# Patient Record
Sex: MALE | Race: WHITE | NOT HISPANIC OR LATINO
[De-identification: names, ages, dates, MRNs, and addresses within clinical notes are randomized per-mention and may not be internally consistent; named-entity substitution may affect disease eponyms.]

---

## 2017-04-19 ENCOUNTER — RECORD ABSTRACTING (OUTPATIENT)
Age: 42
End: 2017-04-19

## 2017-04-19 DIAGNOSIS — Z78.9 OTHER SPECIFIED HEALTH STATUS: ICD-10-CM

## 2017-04-19 DIAGNOSIS — R16.1 SPLENOMEGALY, NOT ELSEWHERE CLASSIFIED: ICD-10-CM

## 2017-04-19 DIAGNOSIS — Z83.2 FAMILY HISTORY OF DISEASES OF THE BLOOD AND BLOOD-FORMING ORGANS AND CERTAIN DISORDERS INVOLVING THE IMMUNE MECHANISM: ICD-10-CM

## 2017-04-19 DIAGNOSIS — R55 SYNCOPE AND COLLAPSE: ICD-10-CM

## 2017-04-19 DIAGNOSIS — R51 HEADACHE: ICD-10-CM

## 2017-04-19 DIAGNOSIS — Z83.49 FAMILY HISTORY OF OTHER ENDOCRINE, NUTRITIONAL AND METABOLIC DISEASES: ICD-10-CM

## 2017-04-19 DIAGNOSIS — Z82.5 FAMILY HISTORY OF ASTHMA AND OTHER CHRONIC LOWER RESPIRATORY DISEASES: ICD-10-CM

## 2017-04-19 DIAGNOSIS — Z72.3 LACK OF PHYSICAL EXERCISE: ICD-10-CM

## 2017-04-19 DIAGNOSIS — D18.03 HEMANGIOMA OF INTRA-ABDOMINAL STRUCTURES: ICD-10-CM

## 2017-04-20 ENCOUNTER — APPOINTMENT (OUTPATIENT)
Dept: INTERNAL MEDICINE | Facility: CLINIC | Age: 42
End: 2017-04-20

## 2017-05-02 ENCOUNTER — APPOINTMENT (OUTPATIENT)
Dept: INTERNAL MEDICINE | Facility: CLINIC | Age: 42
End: 2017-05-02

## 2017-05-02 VITALS
WEIGHT: 140 LBS | SYSTOLIC BLOOD PRESSURE: 104 MMHG | BODY MASS INDEX: 20.73 KG/M2 | HEIGHT: 69 IN | DIASTOLIC BLOOD PRESSURE: 70 MMHG | OXYGEN SATURATION: 95 % | RESPIRATION RATE: 18 BRPM | HEART RATE: 66 BPM | TEMPERATURE: 98.3 F

## 2017-07-14 ENCOUNTER — APPOINTMENT (OUTPATIENT)
Dept: DERMATOLOGY | Facility: CLINIC | Age: 42
End: 2017-07-14

## 2017-07-14 VITALS — HEIGHT: 70 IN | WEIGHT: 138 LBS | BODY MASS INDEX: 19.76 KG/M2

## 2017-07-14 DIAGNOSIS — Z86.59 PERSONAL HISTORY OF OTHER MENTAL AND BEHAVIORAL DISORDERS: ICD-10-CM

## 2017-07-14 DIAGNOSIS — L72.3 SEBACEOUS CYST: ICD-10-CM

## 2017-07-14 DIAGNOSIS — D48.5 NEOPLASM OF UNCERTAIN BEHAVIOR OF SKIN: ICD-10-CM

## 2017-08-14 LAB — CORE LAB BIOPSY: NORMAL

## 2017-11-02 ENCOUNTER — NON-APPOINTMENT (OUTPATIENT)
Age: 42
End: 2017-11-02

## 2017-11-02 ENCOUNTER — APPOINTMENT (OUTPATIENT)
Dept: INTERNAL MEDICINE | Facility: CLINIC | Age: 42
End: 2017-11-02
Payer: COMMERCIAL

## 2017-11-02 VITALS
SYSTOLIC BLOOD PRESSURE: 100 MMHG | HEIGHT: 70 IN | OXYGEN SATURATION: 98 % | BODY MASS INDEX: 20.33 KG/M2 | DIASTOLIC BLOOD PRESSURE: 70 MMHG | HEART RATE: 52 BPM | TEMPERATURE: 98.4 F | WEIGHT: 142 LBS | RESPIRATION RATE: 18 BRPM

## 2017-11-02 PROCEDURE — 94060 EVALUATION OF WHEEZING: CPT

## 2017-11-02 PROCEDURE — 99214 OFFICE O/P EST MOD 30 MIN: CPT | Mod: 25

## 2017-11-30 ENCOUNTER — OTHER (OUTPATIENT)
Age: 42
End: 2017-11-30

## 2018-05-10 ENCOUNTER — APPOINTMENT (OUTPATIENT)
Dept: INTERNAL MEDICINE | Facility: CLINIC | Age: 43
End: 2018-05-10

## 2018-07-11 ENCOUNTER — APPOINTMENT (OUTPATIENT)
Dept: INTERNAL MEDICINE | Facility: CLINIC | Age: 43
End: 2018-07-11
Payer: COMMERCIAL

## 2018-07-11 VITALS
HEIGHT: 70 IN | WEIGHT: 139 LBS | TEMPERATURE: 98.6 F | RESPIRATION RATE: 16 BRPM | OXYGEN SATURATION: 97 % | DIASTOLIC BLOOD PRESSURE: 70 MMHG | BODY MASS INDEX: 19.9 KG/M2 | HEART RATE: 62 BPM | SYSTOLIC BLOOD PRESSURE: 98 MMHG

## 2018-07-11 DIAGNOSIS — N28.1 CYST OF KIDNEY, ACQUIRED: ICD-10-CM

## 2018-07-11 PROCEDURE — 99396 PREV VISIT EST AGE 40-64: CPT | Mod: 25

## 2018-07-11 NOTE — PHYSICAL EXAM
[General Appearance - Alert] : alert [General Appearance - In No Acute Distress] : in no acute distress [Sclera] : the sclera and conjunctiva were normal [PERRL With Normal Accommodation] : pupils were equal in size, round, and reactive to light [Extraocular Movements] : extraocular movements were intact [Outer Ear] : the ears and nose were normal in appearance [Oropharynx] : the oropharynx was normal [Neck Appearance] : the appearance of the neck was normal [Neck Cervical Mass (___cm)] : no neck mass was observed [Jugular Venous Distention Increased] : there was no jugular-venous distention [Thyroid Diffuse Enlargement] : the thyroid was not enlarged [Thyroid Nodule] : there were no palpable thyroid nodules [Auscultation Breath Sounds / Voice Sounds] : lungs were clear to auscultation bilaterally [Heart Rate And Rhythm] : heart rate was normal and rhythm regular [Heart Sounds] : normal S1 and S2 [Heart Sounds Gallop] : no gallops [Murmurs] : no murmurs [Heart Sounds Pericardial Friction Rub] : no pericardial rub [Arterial Pulses Carotid] : carotid pulses were normal with no bruits [Edema] : there was no peripheral edema [Bowel Sounds] : normal bowel sounds [Abdomen Soft] : soft [Abdomen Tenderness] : non-tender [] : no hepato-splenomegaly [Abdomen Mass (___ Cm)] : no abdominal mass palpated [No CVA Tenderness] : no ~M costovertebral angle tenderness [No Spinal Tenderness] : no spinal tenderness [Nail Clubbing] : no clubbing  or cyanosis of the fingernails [FreeTextEntry1] : 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

## 2018-07-11 NOTE — HEALTH RISK ASSESSMENT
[Good] : ~his/her~  mood as  good [No falls in past year] : Patient reported no falls in the past year [0] : 2) Feeling down, depressed, or hopeless: Not at all (0) [Patient declined mammogram] : Patient declined mammogram [Patient declined PAP Smear] : Patient declined PAP Smear [Patient declined bone density test] : Patient declined bone density test [Patient declined colonoscopy] : Patient declined colonoscopy [Learning/Retaining New Information] : difficulty learning/retaining new information [Handling Complex Tasks] : difficulty handling complex tasks [Reasoning] : difficulty with reasoning [Spatial Ability and Orientation] : difficulty with spatial ability and orientation [None] : None [Alone] : lives alone [# of Members in Household ___] :  household currently consist of [unfilled] member(s) [Employed] : employed [Graduate School] : graduate school [] :  [Sexually Active] : sexually active [Feels Safe at Home] : Feels safe at home [Fully functional (bathing, dressing, toileting, transferring, walking, feeding)] : Fully functional (bathing, dressing, toileting, transferring, walking, feeding) [Fully functional (using the telephone, shopping, preparing meals, housekeeping, doing laundry, using] : Fully functional and needs no help or supervision to perform IADLs (using the telephone, shopping, preparing meals, housekeeping, doing laundry, using transportation, managing medications and managing finances) [Reports changes in hearing] : Reports changes in hearing [Reports changes in vision] : Reports changes in vision [Smoke Detector] : smoke detector [Carbon Monoxide Detector] : carbon monoxide detector [Safety elements used in home] : safety elements used in home [Seat Belt] :  uses seat belt [Sunscreen] : uses sunscreen [] : No [de-identified] : socially [Change in mental status noted] : No change in mental status noted [Language] : denies difficulty with language [Behavior] : denies difficulty with behavior [High Risk Behavior] : no high risk behavior [Reports changes in dental health] : Reports no changes in dental health [Guns at Home] : no guns at home [Travel to Developing Areas] : does not  travel to developing areas [TB Exposure] : is not being exposed to tuberculosis [Caregiver Concerns] : does not have caregiver concerns [HIVDate] : 2017 [HepatitisCDate] : 2017 [FreeTextEntry2] :  [de-identified] : tinitis [de-identified] : occasional blurry vision

## 2018-07-11 NOTE — HISTORY OF PRESENT ILLNESS
[de-identified] : Patient comes in today for a wellness evaluation.\par \par Overall, the patient has been doing fairly well. With regards to his underlying hospital sclerosis, he has not followed up with his neurologist recently. He has noted, however, that he has been having some minor symptoms including issues with depth perception which he feels is not where it should be. His speech is somewhat slower and occasionally he notices stuttering. He has not started on the new medication called Ocrevus. He was seen by neuro ophthalmology.\par \par The patient denies any pulmonary issues with regards to his underlying known Tamara Evangelina Daniella syndrome. He denies any breathlessness cough or sputum production. There has been no chest pain. He has not seen any new skin lesions that are also associated with this process.\par \par The patient does not exercise. His appetite is good. There has been no major fluctuation in his weight. He comes in for assessment

## 2018-07-11 NOTE — PLAN
[FreeTextEntry1] : 1. Will continue to observe without medication at this time.\par \par 2. The patient has been told to followup with his neurologist regarding the new and recent symptoms of which she is complaining.\par \par 3. 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 A cardiovascular exercise regimen has been stressed\par \par 5. Follow up with myself in 6 months with full pulmonary function testing and we'll yearly routine fasting blood work.

## 2018-09-13 LAB
CHOLEST SERPL-MCNC: 130
GLUCOSE SERPL-MCNC: 88
HDLC SERPL-MCNC: 51
LDLC SERPL CALC-MCNC: 66

## 2018-10-31 ENCOUNTER — APPOINTMENT (OUTPATIENT)
Dept: DERMATOLOGY | Facility: CLINIC | Age: 43
End: 2018-10-31
Payer: COMMERCIAL

## 2018-10-31 VITALS — HEIGHT: 70 IN | WEIGHT: 139 LBS | BODY MASS INDEX: 19.9 KG/M2

## 2018-10-31 DIAGNOSIS — D36.9 BENIGN NEOPLASM, UNSPECIFIED SITE: ICD-10-CM

## 2018-10-31 PROCEDURE — 17110 DESTRUCTION B9 LES UP TO 14: CPT

## 2018-10-31 PROCEDURE — 99213 OFFICE O/P EST LOW 20 MIN: CPT | Mod: 25

## 2018-11-21 ENCOUNTER — APPOINTMENT (OUTPATIENT)
Dept: DERMATOLOGY | Facility: CLINIC | Age: 43
End: 2018-11-21

## 2018-11-26 ENCOUNTER — APPOINTMENT (OUTPATIENT)
Dept: DERMATOLOGY | Facility: CLINIC | Age: 43
End: 2018-11-26
Payer: COMMERCIAL

## 2018-11-26 DIAGNOSIS — B07.9 VIRAL WART, UNSPECIFIED: ICD-10-CM

## 2018-11-26 DIAGNOSIS — D22.61 MELANOCYTIC NEVI OF RIGHT UPPER LIMB, INCLUDING SHOULDER: ICD-10-CM

## 2018-11-26 DIAGNOSIS — D22.5 MELANOCYTIC NEVI OF TRUNK: ICD-10-CM

## 2018-11-26 PROCEDURE — 17110 DESTRUCTION B9 LES UP TO 14: CPT

## 2018-11-26 PROCEDURE — 99213 OFFICE O/P EST LOW 20 MIN: CPT | Mod: 25

## 2018-12-13 ENCOUNTER — APPOINTMENT (OUTPATIENT)
Dept: INTERNAL MEDICINE | Facility: CLINIC | Age: 43
End: 2018-12-13
Payer: COMMERCIAL

## 2018-12-13 ENCOUNTER — NON-APPOINTMENT (OUTPATIENT)
Age: 43
End: 2018-12-13

## 2018-12-13 VITALS
WEIGHT: 139 LBS | HEART RATE: 64 BPM | RESPIRATION RATE: 20 BRPM | SYSTOLIC BLOOD PRESSURE: 94 MMHG | BODY MASS INDEX: 19.9 KG/M2 | TEMPERATURE: 97.5 F | HEIGHT: 70 IN | OXYGEN SATURATION: 95 % | DIASTOLIC BLOOD PRESSURE: 70 MMHG

## 2018-12-13 PROCEDURE — 94060 EVALUATION OF WHEEZING: CPT

## 2018-12-13 PROCEDURE — 99214 OFFICE O/P EST MOD 30 MIN: CPT | Mod: 25

## 2018-12-13 RX ORDER — CEFUROXIME AXETIL 500 MG/1
500 TABLET ORAL
Qty: 14 | Refills: 0 | Status: COMPLETED | COMMUNITY
Start: 2018-12-13 | End: 2018-12-20

## 2018-12-13 NOTE — REVIEW OF SYSTEMS
[Earache] : earache [Postnasal Drip] : postnasal drip [Nasal Discharge] : nasal discharge [Sore Throat] : sore throat [Wheezing] : wheezing [Cough] : cough [Negative] : Neurological [Fever] : no fever [Chills] : no chills [FreeTextEntry9] : neck pain

## 2018-12-13 NOTE — ASSESSMENT
[FreeTextEntry1] : \par Instructed on correct use of nasal spray.  May continue on daily basis if effective.  \par \par Increase fluids\par Tylenol  prn\par Call if symptoms not improving 3-4 days.\par

## 2018-12-13 NOTE — HISTORY OF PRESENT ILLNESS
[FreeTextEntry8] : Having increased nasal congestion with cough and sinus headache past 2 weeks.   H/O chronic sinus infections and Deviated septum.  He did well after  sinus Sx many years ago but now has more chronic congestion.    He has long standing neck discomfort but recently feels tighter than usual. Aggravated with certain movements.    Advil helps.  He denies any fevers or chills.  Some am sputum.

## 2019-02-06 ENCOUNTER — APPOINTMENT (OUTPATIENT)
Dept: INTERNAL MEDICINE | Facility: CLINIC | Age: 44
End: 2019-02-06

## 2019-02-14 ENCOUNTER — APPOINTMENT (OUTPATIENT)
Dept: INTERNAL MEDICINE | Facility: CLINIC | Age: 44
End: 2019-02-14

## 2019-03-07 ENCOUNTER — APPOINTMENT (OUTPATIENT)
Dept: INTERNAL MEDICINE | Facility: CLINIC | Age: 44
End: 2019-03-07

## 2019-03-25 ENCOUNTER — APPOINTMENT (OUTPATIENT)
Dept: INTERNAL MEDICINE | Facility: CLINIC | Age: 44
End: 2019-03-25
Payer: COMMERCIAL

## 2019-03-25 VITALS
RESPIRATION RATE: 18 BRPM | TEMPERATURE: 98 F | OXYGEN SATURATION: 98 % | HEART RATE: 72 BPM | DIASTOLIC BLOOD PRESSURE: 68 MMHG | BODY MASS INDEX: 19.76 KG/M2 | WEIGHT: 138 LBS | SYSTOLIC BLOOD PRESSURE: 92 MMHG | HEIGHT: 70 IN

## 2019-03-25 DIAGNOSIS — H01.009 UNSPECIFIED BLEPHARITIS UNSPECIFIED EYE, UNSPECIFIED EYELID: ICD-10-CM

## 2019-03-25 PROCEDURE — 94727 GAS DIL/WSHOT DETER LNG VOL: CPT

## 2019-03-25 PROCEDURE — 99214 OFFICE O/P EST MOD 30 MIN: CPT | Mod: 25

## 2019-03-25 PROCEDURE — 94060 EVALUATION OF WHEEZING: CPT

## 2019-03-25 PROCEDURE — 94729 DIFFUSING CAPACITY: CPT

## 2019-03-25 NOTE — PHYSICAL EXAM
[General Appearance - Alert] : alert [General Appearance - In No Acute Distress] : in no acute distress [Sclera] : the sclera and conjunctiva were normal [PERRL With Normal Accommodation] : pupils were equal in size, round, and reactive to light [Extraocular Movements] : extraocular movements were intact [Outer Ear] : the ears and nose were normal in appearance [Oropharynx] : the oropharynx was normal [Neck Appearance] : the appearance of the neck was normal [Neck Cervical Mass (___cm)] : no neck mass was observed [Jugular Venous Distention Increased] : there was no jugular-venous distention [Thyroid Diffuse Enlargement] : the thyroid was not enlarged [Thyroid Nodule] : there were no palpable thyroid nodules [Auscultation Breath Sounds / Voice Sounds] : lungs were clear to auscultation bilaterally [Heart Rate And Rhythm] : heart rate was normal and rhythm regular [Heart Sounds] : normal S1 and S2 [Heart Sounds Gallop] : no gallops [Murmurs] : no murmurs [Heart Sounds Pericardial Friction Rub] : no pericardial rub [Arterial Pulses Carotid] : carotid pulses were normal with no bruits [Edema] : there was no peripheral edema [Bowel Sounds] : normal bowel sounds [Abdomen Soft] : soft [Abdomen Tenderness] : non-tender [] : no hepato-splenomegaly [Abdomen Mass (___ Cm)] : no abdominal mass palpated [FreeTextEntry1] : 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

## 2019-03-25 NOTE — DATA REVIEWED
[FreeTextEntry1] : A pulmonary function test is performed. Lung volumes are mildly reduced in a symmetric fashion. Lung mechanics are within normal limits. Slight probability reactivity is demonstrated. The DLCO and saturation are maintained. This represents a mild degree of her strict lung disease. Slight airway reactivity is noted.

## 2019-03-25 NOTE — PLAN
[FreeTextEntry1] : 1. Continue to observe without medication on a daily basis.\par \par 2. Patient will continue to receive Ocrevus 600 mg via infusion every 6 months.\par \par 3. I would like the patient is receiving Pneumovax 23 vaccine. He will discuss this with his neurologist as to whether or not he can handle this from an immunologic standpoint.\par \par 4. The patient will undergo a followup MRI of the abdomen with gadolinium in November of 2020. That would be 3 years from the most recent study. The current recommendation is to perform this study every 36-48 months as surveillance to assess for a possible renal tumor due to this history of Sogo-Alrg-Jfpp syndrome.\par \par 5. Routine fasting blood work to be performed at this time.\par \par 6. CAT scan of the chest to be performed to evaluate for any worsening of his intrapulmonary cysts, and to assess for subpleural blebs.\par \par 7. Follow up with myself in 6 months with a wellness evaluation. Flu shot will be administered at that time.\par \par 8. The patient has been instructed to pain in his left eye with warm water and Ke's baby shampoo over the next several days. He has responded to this intervention in the past. If there is no improvement, we will call in an ophthalmic solution.

## 2019-04-10 ENCOUNTER — APPOINTMENT (OUTPATIENT)
Dept: DERMATOLOGY | Facility: CLINIC | Age: 44
End: 2019-04-10
Payer: COMMERCIAL

## 2019-04-10 DIAGNOSIS — L85.1 ACQUIRED KERATOSIS [KERATODERMA] PALMARIS ET PLANTARIS: ICD-10-CM

## 2019-04-10 PROCEDURE — 99213 OFFICE O/P EST LOW 20 MIN: CPT

## 2019-04-10 NOTE — ASSESSMENT
[FreeTextEntry1] : Lesion pared, no obvious residual wart;\par prob keratoderma\par dressed;  use advanced healing bandages to avoid trauma;\par

## 2019-06-09 PROBLEM — R51 CHRONIC HEADACHE DISORDER: Status: ACTIVE | Noted: 2017-04-19

## 2019-08-19 ENCOUNTER — APPOINTMENT (OUTPATIENT)
Dept: DERMATOLOGY | Facility: CLINIC | Age: 44
End: 2019-08-19
Payer: COMMERCIAL

## 2019-08-19 DIAGNOSIS — L25.5 UNSPECIFIED CONTACT DERMATITIS DUE TO PLANTS, EXCEPT FOOD: ICD-10-CM

## 2019-08-19 PROCEDURE — 99213 OFFICE O/P EST LOW 20 MIN: CPT

## 2019-08-20 PROBLEM — L25.5 RHUS DERMATITIS: Status: ACTIVE | Noted: 2019-08-20

## 2019-08-20 NOTE — PHYSICAL EXAM
[FreeTextEntry3] : Skin examination performed of the face, neck, chest, hands, lower legs;\par The patient is well, alert and oriented, pleasant and cooperative.\par Eyelids, conjunctivae, oral mucosa, digits and nails all normal.  \par No cervical adenopathy.\par \par \par + linear vesicular eczematous patches; R hand, fingers, arm, trunk, waistline;  trunk;

## 2019-08-20 NOTE — ASSESSMENT
[FreeTextEntry1] : rhus;  fairly extensive even after 3-4 days; spreading\par Prednisone taper, diprolene gel Rx;

## 2019-08-20 NOTE — HISTORY OF PRESENT ILLNESS
[de-identified] : The patient has been fit in for urgent appointment. Rash on right hand, arm;  very itchy;  was doing yardwork few days before outbreak

## 2019-10-02 ENCOUNTER — FORM ENCOUNTER (OUTPATIENT)
Age: 44
End: 2019-10-02

## 2019-10-03 ENCOUNTER — APPOINTMENT (OUTPATIENT)
Age: 44
End: 2019-10-03
Payer: COMMERCIAL

## 2019-10-03 ENCOUNTER — APPOINTMENT (OUTPATIENT)
Dept: RADIOLOGY | Facility: CLINIC | Age: 44
End: 2019-10-03
Payer: COMMERCIAL

## 2019-10-03 ENCOUNTER — FORM ENCOUNTER (OUTPATIENT)
Age: 44
End: 2019-10-03

## 2019-10-03 ENCOUNTER — OUTPATIENT (OUTPATIENT)
Dept: OUTPATIENT SERVICES | Facility: HOSPITAL | Age: 44
LOS: 1 days | End: 2019-10-03
Payer: COMMERCIAL

## 2019-10-03 VITALS
WEIGHT: 138 LBS | OXYGEN SATURATION: 99 % | SYSTOLIC BLOOD PRESSURE: 104 MMHG | HEIGHT: 70 IN | TEMPERATURE: 98.2 F | BODY MASS INDEX: 19.76 KG/M2 | RESPIRATION RATE: 18 BRPM | HEART RATE: 58 BPM | DIASTOLIC BLOOD PRESSURE: 72 MMHG

## 2019-10-03 DIAGNOSIS — M54.6 PAIN IN THORACIC SPINE: ICD-10-CM

## 2019-10-03 DIAGNOSIS — M62.838 OTHER MUSCLE SPASM: ICD-10-CM

## 2019-10-03 DIAGNOSIS — Z00.00 ENCOUNTER FOR GENERAL ADULT MEDICAL EXAMINATION W/OUT ABNORMAL FINDINGS: ICD-10-CM

## 2019-10-03 DIAGNOSIS — Z00.8 ENCOUNTER FOR OTHER GENERAL EXAMINATION: ICD-10-CM

## 2019-10-03 PROCEDURE — 72070 X-RAY EXAM THORAC SPINE 2VWS: CPT

## 2019-10-03 PROCEDURE — 72070 X-RAY EXAM THORAC SPINE 2VWS: CPT | Mod: 26

## 2019-10-03 PROCEDURE — 71046 X-RAY EXAM CHEST 2 VIEWS: CPT | Mod: 26

## 2019-10-03 PROCEDURE — 99213 OFFICE O/P EST LOW 20 MIN: CPT

## 2019-10-03 PROCEDURE — 71046 X-RAY EXAM CHEST 2 VIEWS: CPT

## 2019-10-03 RX ORDER — PREDNISONE 20 MG/1
20 TABLET ORAL
Qty: 24 | Refills: 0 | Status: COMPLETED | COMMUNITY
Start: 2019-08-20 | End: 2019-10-03

## 2019-10-03 NOTE — PHYSICAL EXAM
[No Acute Distress] : no acute distress [Well Nourished] : well nourished [Well Developed] : well developed [Supple] : supple [No Respiratory Distress] : no respiratory distress  [No Accessory Muscle Use] : no accessory muscle use [Clear to Auscultation] : lungs were clear to auscultation bilaterally [Normal Rate] : normal rate  [Regular Rhythm] : with a regular rhythm [Grossly Normal Strength/Tone] : grossly normal strength/tone [de-identified] : thoracic lumbar tenderness noted on palpation , full range of motion displayed,  motor strength 5/5

## 2019-10-03 NOTE — REVIEW OF SYSTEMS
[Back Pain] : back pain [Negative] : Genitourinary [Chills] : no chills [Fever] : no fever [Fatigue] : no fatigue [Shortness Of Breath] : no shortness of breath [Wheezing] : no wheezing [Cough] : no cough [FreeTextEntry9] : see HPI

## 2019-10-03 NOTE — PLAN
[FreeTextEntry1] : CXRY images reviewed by Dr. Villatoro. 1.1 cm opacity over right upper lobe. CT chest without contrast ordered and pt notified. he will f/up with Dr. Guidry next week as scheduled.

## 2019-10-03 NOTE — HISTORY OF PRESENT ILLNESS
[FreeTextEntry8] : Pt presents  to the office today with complaints of thoracic back discomfort x 1 week. He does not recall any precipitating trauma or injury. he reports the pain as sharp at  times , 4/10. It  is particularly bothersome when he twists from side to side . He has  a history of Krista- Evangelina Daniella Syndrome . He did undergo a pleurodesis in 2015  and sometimes has resultant  discomfort in his chest wall. He occasionally has mild dyspnea on exertion . He works teaching and remains active. He denies any increase in GOODE  or pleuritic pain. He denies fever, chills, chest pain, radiculopathy, bowel or bladder dysfunction. . \par He sees Dr. Cassidy  regularly for multiple sclerosis. Denies any increase in symptoms , vision change or  balance issues.\par He has an appointment  next week with Dr. Guidry . A f/ up CT chest is pending insurance approval.   \par

## 2019-10-03 NOTE — ASSESSMENT
[FreeTextEntry1] : CXRY PA/LAT , \par XRAY thoracic  at [facility]\par Flexeril 5 mg po bedtime PRN, spasms\par Naproxen 375 mg po BID PRN\par F/up Dr. Earl, if symptoms persist, May need MRI r/ o disc dse. \par F/up CT chest h/ of Tamara- Evangelina- Daniella syndrome , spoke to Mita, in appeal process \par  Appointment 10/10/19 Dr. Guidry  \par \par  \par

## 2019-10-04 ENCOUNTER — APPOINTMENT (OUTPATIENT)
Dept: CT IMAGING | Facility: CLINIC | Age: 44
End: 2019-10-04
Payer: COMMERCIAL

## 2019-10-04 ENCOUNTER — OUTPATIENT (OUTPATIENT)
Dept: OUTPATIENT SERVICES | Facility: HOSPITAL | Age: 44
LOS: 1 days | End: 2019-10-04
Payer: COMMERCIAL

## 2019-10-04 DIAGNOSIS — R93.89 ABNORMAL FINDINGS ON DIAGNOSTIC IMAGING OF OTHER SPECIFIED BODY STRUCTURES: ICD-10-CM

## 2019-10-04 PROCEDURE — 71250 CT THORAX DX C-: CPT | Mod: 26

## 2019-10-04 PROCEDURE — 71250 CT THORAX DX C-: CPT

## 2019-10-10 ENCOUNTER — APPOINTMENT (OUTPATIENT)
Dept: INTERNAL MEDICINE | Facility: CLINIC | Age: 44
End: 2019-10-10
Payer: COMMERCIAL

## 2019-10-10 VITALS
SYSTOLIC BLOOD PRESSURE: 114 MMHG | RESPIRATION RATE: 16 BRPM | HEART RATE: 62 BPM | DIASTOLIC BLOOD PRESSURE: 70 MMHG | BODY MASS INDEX: 20.76 KG/M2 | OXYGEN SATURATION: 97 % | TEMPERATURE: 98.1 F | HEIGHT: 70 IN | WEIGHT: 145 LBS

## 2019-10-10 PROCEDURE — 99396 PREV VISIT EST AGE 40-64: CPT

## 2019-10-10 RX ORDER — NAPROXEN 375 MG/1
375 TABLET ORAL
Qty: 30 | Refills: 1 | Status: DISCONTINUED | COMMUNITY
Start: 2019-10-03 | End: 2019-10-10

## 2019-10-10 RX ORDER — FLUTICASONE PROPIONATE 50 UG/1
50 SPRAY, METERED NASAL DAILY
Qty: 1 | Refills: 3 | Status: DISCONTINUED | COMMUNITY
Start: 2018-12-13 | End: 2019-10-10

## 2019-10-10 RX ORDER — BETAMETHASONE DIPROPIONATE 0.5 MG/G
0.05 GEL TOPICAL
Qty: 1 | Refills: 1 | Status: DISCONTINUED | COMMUNITY
Start: 2019-08-20 | End: 2019-10-10

## 2019-10-10 RX ORDER — CYCLOBENZAPRINE HYDROCHLORIDE 5 MG/1
5 TABLET, FILM COATED ORAL
Qty: 15 | Refills: 0 | Status: DISCONTINUED | COMMUNITY
Start: 2019-10-03 | End: 2019-10-10

## 2019-10-10 NOTE — REVIEW OF SYSTEMS
[Fatigue] : fatigue [Frequency] : frequency [Hesitancy] : hesitancy [Nocturia] : nocturia [Joint Pain] : joint pain [Back Pain] : back pain [Anxiety] : anxiety [Joint Swelling] : joint swelling [Negative] : Heme/Lymph [FreeTextEntry4] : Ringing in ears [de-identified] : Feels off balance

## 2019-10-10 NOTE — PHYSICAL EXAM
[Coordination Grossly Intact] : coordination grossly intact [Normal Gait] : normal gait [Normal Affect] : the affect was normal [Normal Insight/Judgement] : insight and judgment were intact [General Appearance - Alert] : alert [General Appearance - In No Acute Distress] : in no acute distress [Sclera] : the sclera and conjunctiva were normal [PERRL With Normal Accommodation] : pupils were equal in size, round, and reactive to light [Extraocular Movements] : extraocular movements were intact [Outer Ear] : the ears and nose were normal in appearance [Oropharynx] : the oropharynx was normal [Neck Appearance] : the appearance of the neck was normal [Neck Cervical Mass (___cm)] : no neck mass was observed [Jugular Venous Distention Increased] : there was no jugular-venous distention [Thyroid Diffuse Enlargement] : the thyroid was not enlarged [Thyroid Nodule] : there were no palpable thyroid nodules [Auscultation Breath Sounds / Voice Sounds] : lungs were clear to auscultation bilaterally [Heart Rate And Rhythm] : heart rate was normal and rhythm regular [Heart Sounds] : normal S1 and S2 [Heart Sounds Gallop] : no gallops [Murmurs] : no murmurs [Heart Sounds Pericardial Friction Rub] : no pericardial rub [Arterial Pulses Carotid] : carotid pulses were normal with no bruits [Edema] : there was no peripheral edema [Bowel Sounds] : normal bowel sounds [Abdomen Soft] : soft [Abdomen Tenderness] : non-tender [] : no hepato-splenomegaly [Abdomen Mass (___ Cm)] : no abdominal mass palpated [No CVA Tenderness] : no ~M costovertebral angle tenderness [No Spinal Tenderness] : no spinal tenderness [Nail Clubbing] : no clubbing  or cyanosis of the fingernails [FreeTextEntry1] : 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

## 2019-10-10 NOTE — PLAN
[FreeTextEntry1] : 1. Exercise to Increase cardiovascular capacity.\par \par 2. Flu and pneumonia vaccines are refused.\par \par 3. MRI of abdomen and pelvis in one year To evaluate and screen for the possibility of renal cell carcinoma, given his history of Ywbg-Lxak-Euan syndrome.\par \par 4. Referred to cardiothoracic surgeon Dr. Lemus for consultation regarding possible prophylactic removal of blebs in left hemithorax.The patient is concerned about possibly developing another spontaneous pneumothorax in the future.\par \par 5. Routine fasting blood work including STD analysis.\par \par 6. Follow up with myself in 6 months for a PFT.\par \par 7. A neurosurgical consultation with Dr. Earl for further evaluation of his back pain.

## 2019-10-10 NOTE — END OF VISIT
[FreeTextEntry3] : I, Loco Mendoza, acted solely as a scribe for Dr. Lance Guidry MD on this date 10/10/2019. \par \par All medical records entries made by the Scribe were at my, Dr. Lance Guidry MD, direction and personally dictated by me on 10/10/2019. I have personally reviewed the chart and agree that the record accurately reflects my personal performance of the history, physical exam, assessment, and plan. I have also personally directed, reviewed, and agreed with the chart.

## 2019-10-10 NOTE — HEALTH RISK ASSESSMENT
[Good] : ~his/her~  mood as  good [Yes] : Yes [Monthly or less (1 pt)] : Monthly or less (1 point) [1 or 2 (0 pts)] : 1 or 2 (0 points) [Never (0 pts)] : Never (0 points) [No] : In the past 12 months have you used drugs other than those required for medical reasons? No [0] : 1) Little interest or pleasure doing things: Not at all (0) [No Retinopathy] : No retinopathy [None] : None [Employed] : employed [Sexually Active] : sexually active [Feels Safe at Home] : Feels safe at home [Fully functional (using the telephone, shopping, preparing meals, housekeeping, doing laundry, using] : Fully functional and needs no help or supervision to perform IADLs (using the telephone, shopping, preparing meals, housekeeping, doing laundry, using transportation, managing medications and managing finances) [Fully functional (bathing, dressing, toileting, transferring, walking, feeding)] : Fully functional (bathing, dressing, toileting, transferring, walking, feeding) [Reports normal functional visual acuity (ie: able to read med bottle)] : Reports normal functional visual acuity [Smoke Detector] : smoke detector [Carbon Monoxide Detector] : carbon monoxide detector [Sunscreen] : uses sunscreen [Seat Belt] :  uses seat belt [] : No [de-identified] : Occasional [EyeExamDate] : 05/2019 [Change in mental status noted] : No change in mental status noted [Language] : denies difficulty with language [Behavior] : denies difficulty with behavior [Learning/Retaining New Information] : denies difficulty learning/retaining new information [Handling Complex Tasks] : denies difficulty handling complex tasks [Reasoning] : denies difficulty with reasoning [Spatial Ability and Orientation] : denies difficulty with spatial ability and orientation [High Risk Behavior] : no high risk behavior [Reports changes in hearing] : Reports no changes in hearing [Reports changes in vision] : Reports no changes in vision [Reports changes in dental health] : Reports no changes in dental health [Guns at Home] : no guns at home

## 2019-10-10 NOTE — HISTORY OF PRESENT ILLNESS
[de-identified] : This patient comes in today for a wellness evaluation.\par \par He reports occasional debilitating back pain. Back and chest X-Ray were performed and were unremarkable. He denies paresthesias, or incontinence of urine or stool.He notes that the symptoms are slowly improving. He has had similar issues in the past.\par \par With regard to his underlying pulmonary issues, he had a CT scan of his chest performed on 10/4/2019. It revealed bilateral lung cysts, partial resection of the right upper lung, and right lower lobe subsegmental atelectasis. These changes are consistent with his known history of Afhc-Uglg-Sjoy syndrome. He denies any cough, or sputum production. Occasionally, he does complain of discomfort in the right hemithorax closer to the midline. The symptoms usually resolve on their own. He does not have any dyspnea.He has no history of smoking. \par \par His dermatologic health is closely monitored by Dr. Reed.\par \par With regard to his multiple sclerosis, he receives Ocrevus treatment every 6 months. He continues to be followed carefully by neurology. He does not exercise regularly and denies any headaches. \par \par Today he comes in for this assessment.

## 2019-10-10 NOTE — ADDENDUM
[FreeTextEntry1] : I, Loco Mendoza, acted solely as a scribe for Dr. Guidry on this date 10/10/2019.

## 2019-10-11 ENCOUNTER — TRANSCRIPTION ENCOUNTER (OUTPATIENT)
Age: 44
End: 2019-10-11

## 2019-10-27 ENCOUNTER — TRANSCRIPTION ENCOUNTER (OUTPATIENT)
Age: 44
End: 2019-10-27

## 2019-11-25 ENCOUNTER — APPOINTMENT (OUTPATIENT)
Dept: INTERNAL MEDICINE | Facility: CLINIC | Age: 44
End: 2019-11-25
Payer: COMMERCIAL

## 2019-11-25 ENCOUNTER — NON-APPOINTMENT (OUTPATIENT)
Age: 44
End: 2019-11-25

## 2019-11-25 ENCOUNTER — APPOINTMENT (OUTPATIENT)
Dept: DERMATOLOGY | Facility: CLINIC | Age: 44
End: 2019-11-25
Payer: COMMERCIAL

## 2019-11-25 VITALS
OXYGEN SATURATION: 97 % | SYSTOLIC BLOOD PRESSURE: 112 MMHG | DIASTOLIC BLOOD PRESSURE: 70 MMHG | RESPIRATION RATE: 18 BRPM | WEIGHT: 137.99 LBS | TEMPERATURE: 98.3 F | HEART RATE: 64 BPM | BODY MASS INDEX: 19.75 KG/M2 | HEIGHT: 70 IN

## 2019-11-25 VITALS — WEIGHT: 145 LBS | BODY MASS INDEX: 20.76 KG/M2 | HEIGHT: 70 IN

## 2019-11-25 DIAGNOSIS — L82.1 OTHER SEBORRHEIC KERATOSIS: ICD-10-CM

## 2019-11-25 DIAGNOSIS — L72.3 SEBACEOUS CYST: ICD-10-CM

## 2019-11-25 PROCEDURE — 94060 EVALUATION OF WHEEZING: CPT

## 2019-11-25 PROCEDURE — 10061 I&D ABSCESS COMP/MULTIPLE: CPT

## 2019-11-25 PROCEDURE — 99214 OFFICE O/P EST MOD 30 MIN: CPT | Mod: 25

## 2019-11-25 RX ORDER — AMOXICILLIN AND CLAVULANATE POTASSIUM 875; 125 MG/1; MG/1
875-125 TABLET, COATED ORAL
Qty: 20 | Refills: 0 | Status: COMPLETED | COMMUNITY
Start: 2019-10-27

## 2019-11-25 RX ORDER — AZELASTINE HYDROCHLORIDE 137 UG/1
137 SPRAY, METERED NASAL
Qty: 30 | Refills: 0 | Status: DISCONTINUED | COMMUNITY
Start: 2019-05-06 | End: 2019-11-25

## 2019-11-25 RX ORDER — BROMPHENIRAMINE MALEATE, PSEUDOEPHEDRINE HYDROCHLORIDE, 2; 30; 10 MG/5ML; MG/5ML; MG/5ML
30-2-10 SYRUP ORAL
Qty: 300 | Refills: 0 | Status: DISCONTINUED | COMMUNITY
Start: 2019-10-27 | End: 2019-11-25

## 2019-11-25 NOTE — ASSESSMENT
[FreeTextEntry1] : Benign SKs;  upper back\par inflamed cyst;  I&D today;  f/u if recurs; nature explained

## 2019-11-25 NOTE — ASSESSMENT
[FreeTextEntry1] : Doxycycline x 7 days\par \par Use Astelin daily\par \par Consider return to ENT for re-evaluation of chronic sinusitis.

## 2019-11-25 NOTE — PHYSICAL EXAM
[Normal TMs] : both tympanic membranes were normal [de-identified] : no sinus tenderness [Normal] : the outer ears and nose were normal in appearance and the oropharynx was normal [General Appearance - In No Acute Distress] : in no acute distress [Neck Appearance] : the appearance of the neck was normal [General Appearance - Alert] : alert [Jugular Venous Distention Increased] : there was no jugular-venous distention [Neck Cervical Mass (___cm)] : no neck mass was observed [Thyroid Diffuse Enlargement] : the thyroid was not enlarged [Thyroid Nodule] : there were no palpable thyroid nodules [] : no respiratory distress [Heart Sounds] : normal S1 and S2 [Heart Rate And Rhythm] : heart rate was normal and rhythm regular [Auscultation Breath Sounds / Voice Sounds] : lungs were clear to auscultation bilaterally [FreeTextEntry1] : 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 [Murmurs] : no murmurs [Heart Sounds Gallop] : no gallops [Heart Sounds Pericardial Friction Rub] : no pericardial rub

## 2019-11-25 NOTE — HISTORY OF PRESENT ILLNESS
[FreeTextEntry8] : cough\par \par Developed cough and nasal congestion 5 days ago.  NO fevers or chills.  +wheeze-mild.  cough is occasionally of  yellow sputum.      Cough worse in evening.  currently taking sudafed which helps his nasal congestion.\par Treated one month ago by Urgent care for sinusitis.  Placed on Augmentin and cough syrup with  full resolution of symptoms after few days.   \par \par Had cyst removed from L neck today.  \par \par

## 2019-11-25 NOTE — HISTORY OF PRESENT ILLNESS
[de-identified] : Pt. c/o lesion on neck;  enlarged since last 5d;  not painful\par has had similar lesions in past;\par \par also:  changing spot on back

## 2020-03-01 ENCOUNTER — LABORATORY RESULT (OUTPATIENT)
Age: 45
End: 2020-03-01

## 2020-03-02 ENCOUNTER — TRANSCRIPTION ENCOUNTER (OUTPATIENT)
Age: 45
End: 2020-03-02

## 2020-03-03 LAB
BASOPHILS # BLD AUTO: 0.05 K/UL
BASOPHILS NFR BLD AUTO: 0.7 %
EOSINOPHIL # BLD AUTO: 0.2 K/UL
EOSINOPHIL NFR BLD AUTO: 2.7 %
HCT VFR BLD CALC: 49 %
HGB BLD-MCNC: 13.5 G/DL
IMM GRANULOCYTES NFR BLD AUTO: 0.3 %
LYMPHOCYTES # BLD AUTO: 0.94 K/UL
LYMPHOCYTES NFR BLD AUTO: 12.7 %
MAN DIFF?: NORMAL
MCHC RBC-ENTMCNC: 17.2 PG
MCHC RBC-ENTMCNC: 27.6 GM/DL
MCV RBC AUTO: 62.5 FL
MONOCYTES # BLD AUTO: 0.54 K/UL
MONOCYTES NFR BLD AUTO: 7.3 %
NEUTROPHILS # BLD AUTO: 5.67 K/UL
NEUTROPHILS NFR BLD AUTO: 76.3 %
PLATELET # BLD AUTO: 269 K/UL
RBC # BLD: 7.84 M/UL
RBC # FLD: 20 %
WBC # FLD AUTO: 7.42 K/UL

## 2020-04-22 ENCOUNTER — APPOINTMENT (OUTPATIENT)
Dept: INTERNAL MEDICINE | Facility: CLINIC | Age: 45
End: 2020-04-22
Payer: COMMERCIAL

## 2020-04-22 DIAGNOSIS — J93.83 OTHER PNEUMOTHORAX: ICD-10-CM

## 2020-04-22 PROCEDURE — 99213 OFFICE O/P EST LOW 20 MIN: CPT | Mod: 95

## 2020-04-22 RX ORDER — DOXYCYCLINE HYCLATE 100 MG/1
100 TABLET ORAL TWICE DAILY
Qty: 14 | Refills: 0 | Status: DISCONTINUED | COMMUNITY
Start: 2019-11-25 | End: 2020-04-22

## 2020-04-22 NOTE — HISTORY OF PRESENT ILLNESS
[Home] : at home, [unfilled] , at the time of the visit. [Medical Office: ([facility])___] : at the medical office located in  [Patient] : the patient [Self] : self [de-identified] : Spoke with the patient today.\par \par The patient states, that at this time, he is doing relatively well. With regards to his underlying pulmonary issues, he remains asymptomatic. He did undergo a followup CAT scan of the chest which was performed in October. It revealed multiple cysts in the lungs consistent with his clinical diagnosis of Wnbc-Pqby-Yqhj syndrome. He was evaluated by his thoracic surgeon, to see if he would be a candidate for prophylactic bullectomy on the left. It was felt, that this was not necessary at this time. He told the patient, that he should return for reevaluation in 5 years.\par \par The patient does have a history of multiple sclerosis. He receives Ocrevus semi-annually. He is one month overdue on his medication, and he has noted that he has been having some issues with speech and balance. The issues are minor, but he definitely can notice the difference. He denies any headaches, double vision, or blurred vision.\par \par The patient remains active. He denies any constitutional symptoms at this time. He did undergo blood work, and some of the results did not interface properly, so they are still pending review.

## 2020-04-22 NOTE — PLAN
[FreeTextEntry1] : 1. We'll attempt to obtain the results of the blood work performed last month.\par \par 2. The patient will continue to quarantine due to the corona virus epidemic. He is immunocompromised due to the fact that he is on Ocrevus for treatment of his multiple sclerosis.\par \par 3. Followup in 6 months with full pulmonary function testing. The patient will need to be scheduled for an MRI of the abdomen and pelvis at that time to evaluate for possible renal cell carcinoma, which has an increased incidence in patients with Kjwd-Lcfq-Jqyt syndrome.\par \par total length of time of this was 15 minutes.

## 2020-04-22 NOTE — PHYSICAL EXAM
Subjective:       Patient ID: Dennis Ovalle Jr. is a 21 y.o. male.    Chief Complaint: No chief complaint on file.    HPI The patient location is: Home  The chief complaint leading to consultation is:  Cough, altered taste and smell, sneezing, possible COVID contact  Visit type: Virtual visit with synchronous audio and video  Total time spent with patient:  15 min  Each patient to whom he or she provides medical services by telemedicine is:  (1) informed of the relationship between the physician and patient and the respective role of any other health care provider with respect to management of the patient; and (2) notified that he or she may decline to receive medical services by telemedicine and may withdraw from such care at any time.    Notes:  21-year-old  male with mild intermittent asthma is seen via video visit secondary to concerns of possible COVID.  He reports symptoms of shortness of breath, cough, and sneezing for which he was seen in the emergency room on Wednesday.  Full evaluation was performed including chest x-ray which returned within normal limits.  He was noted not to be a candidate for testing.  He continues to remain afebrile but notes that yesterday he began to note and altered sense of taste and smell and became concerned for possible COVID.  He reports that his cousin has been tested and did test positive and he was in contact with his cousin.  He has continued to remain afebrile.  He reports that he has not taken Flonase nasal spray, albuterol, or Singulair which all have previously been prescribed for his asthma.  He has used Tessalon Perles for cough with improvement of his cough.    Review of Systems   Constitutional: Negative for appetite change, chills, fatigue and fever.   HENT: Positive for congestion, postnasal drip, rhinorrhea and sneezing. Negative for ear pain, hearing loss, sinus pressure, sore throat and tinnitus.         Altered taste and smell   Eyes:  Negative for redness, itching and visual disturbance.   Respiratory: Positive for cough and chest tightness. Negative for shortness of breath.    Cardiovascular: Negative for chest pain and palpitations.   Gastrointestinal: Negative for abdominal pain, constipation, diarrhea, nausea and vomiting.   Genitourinary: Negative for decreased urine volume, difficulty urinating, dysuria, frequency, hematuria and urgency.   Musculoskeletal: Negative for back pain, myalgias, neck pain and neck stiffness.   Skin: Negative for rash.   Neurological: Negative for dizziness, light-headedness and headaches.   Psychiatric/Behavioral: Negative.        Objective:      Limited physical exam is performed via telemedicine.  The patient is sitting in his home and appears comfortable without any shortness of breath or cough.  Assessment:       1. Mild intermittent asthma without complication        Plan:       Mild intermittent asthma without complication  -     fluticasone propionate (FLONASE) 50 mcg/actuation nasal spray; 2 sprays (100 mcg total) by Each Nostril route once daily.  Dispense: 16 g; Refill: 11  -     montelukast (SINGULAIR) 10 mg tablet; Take 1 tablet (10 mg total) by mouth every evening.  Dispense: 30 tablet; Refill: 11  -     albuterol (PROVENTIL/VENTOLIN HFA) 90 mcg/actuation inhaler; Inhale 2 puffs into the lungs every 6 (six) hours as needed for Wheezing or Shortness of Breath. Rescue  Dispense: 18 g; Refill: 11      I have strongly encouraged the patient to be compliant with all asthma medications as his alteration of smell and taste may be secondary to allergic rhinitis.  At this time the patient does not meet criteria for COVID testing.  I have encouraged the patient to continue self-quarantine and to continue monitoring his symptoms.  I have encouraged the patient to reach out if he has any worsening symptoms such as fever greater than 100.4, worsening cough, worsening shortness of breath.  Patient expresses  understanding.   [Normal] : no acute distress, well nourished, well developed and well-appearing [No Respiratory Distress] : no respiratory distress  [Normal Affect] : the affect was normal [Normal Insight/Judgement] : insight and judgment were intact

## 2020-04-23 ENCOUNTER — APPOINTMENT (OUTPATIENT)
Dept: INTERNAL MEDICINE | Facility: CLINIC | Age: 45
End: 2020-04-23

## 2020-07-08 ENCOUNTER — APPOINTMENT (OUTPATIENT)
Dept: INTERNAL MEDICINE | Facility: CLINIC | Age: 45
End: 2020-07-08
Payer: COMMERCIAL

## 2020-07-08 VITALS
BODY MASS INDEX: 20.19 KG/M2 | DIASTOLIC BLOOD PRESSURE: 72 MMHG | HEIGHT: 70 IN | TEMPERATURE: 98.6 F | WEIGHT: 141 LBS | SYSTOLIC BLOOD PRESSURE: 114 MMHG | HEART RATE: 60 BPM | OXYGEN SATURATION: 98 % | RESPIRATION RATE: 16 BRPM

## 2020-07-08 DIAGNOSIS — R30.0 DYSURIA: ICD-10-CM

## 2020-07-08 PROCEDURE — 81003 URINALYSIS AUTO W/O SCOPE: CPT | Mod: QW

## 2020-07-08 PROCEDURE — 99214 OFFICE O/P EST MOD 30 MIN: CPT

## 2020-07-08 NOTE — PLAN
[FreeTextEntry1] : Mr. Vegas presents for acute evaluation complaining of "dysuria". Urine dip shows trace leukocyte esterase, however, it is nitrate negative and negative for blood. Patient will be given a trial of Cipro 500 mg b.i.d. x7 days and will send a urine for microscopic urinalysis as well as urine culture. I also referred him to Dr. David Boyer for urology evaluation. Followup as scheduled.

## 2020-07-08 NOTE — HISTORY OF PRESENT ILLNESS
[FreeTextEntry8] : Mr. Vegas presents for acute evaluation. He is complaining of a feeling of discomfort with urination, especially after having sexual intercourse. This has been an ongoing issue with the patient but it seems to be worse in the past few days. He did go to a neurologist the prostate 20 years ago for similar complaints. He has no fevers or chills. There is no change in the color of his urine. Mr. Vegas denies any hematuria.

## 2020-07-09 LAB
APPEARANCE: CLEAR
BACTERIA: NEGATIVE
BILIRUB UR QL STRIP: NEGATIVE
BILIRUBIN URINE: NEGATIVE
BLOOD URINE: NEGATIVE
CLARITY UR: NORMAL
COLLECTION METHOD: NORMAL
COLOR: NORMAL
GLUCOSE QUALITATIVE U: NEGATIVE
GLUCOSE UR-MCNC: NEGATIVE
HCG UR QL: 0.2 EU/DL
HGB UR QL STRIP.AUTO: NEGATIVE
HYALINE CASTS: 0 /LPF
KETONES UR-MCNC: NEGATIVE
KETONES URINE: NEGATIVE
LEUKOCYTE ESTERASE UR QL STRIP: NORMAL
LEUKOCYTE ESTERASE URINE: NEGATIVE
MICROSCOPIC-UA: NORMAL
NITRITE UR QL STRIP: NEGATIVE
NITRITE URINE: NEGATIVE
PH UR STRIP: 6.5
PH URINE: 6
PROT UR STRIP-MCNC: NEGATIVE
PROTEIN URINE: NEGATIVE
RED BLOOD CELLS URINE: 0 /HPF
SP GR UR STRIP: 1.01
SPECIFIC GRAVITY URINE: 1
SQUAMOUS EPITHELIAL CELLS: 0 /HPF
UROBILINOGEN URINE: NORMAL
WHITE BLOOD CELLS URINE: 4 /HPF

## 2020-07-10 ENCOUNTER — APPOINTMENT (OUTPATIENT)
Dept: UROLOGY | Facility: CLINIC | Age: 45
End: 2020-07-10
Payer: COMMERCIAL

## 2020-07-10 VITALS — TEMPERATURE: 97.7 F

## 2020-07-10 VITALS
BODY MASS INDEX: 20.19 KG/M2 | SYSTOLIC BLOOD PRESSURE: 122 MMHG | DIASTOLIC BLOOD PRESSURE: 73 MMHG | WEIGHT: 141 LBS | HEIGHT: 70 IN

## 2020-07-10 PROCEDURE — 99204 OFFICE O/P NEW MOD 45 MIN: CPT

## 2020-07-13 LAB
APPEARANCE: CLEAR
BACTERIA UR CULT: NORMAL
BACTERIA UR CULT: NORMAL
BACTERIA: NEGATIVE
BILIRUBIN URINE: NEGATIVE
BLOOD URINE: NEGATIVE
COLOR: COLORLESS
GLUCOSE QUALITATIVE U: NEGATIVE
HYALINE CASTS: 0 /LPF
KETONES URINE: NEGATIVE
LEUKOCYTE ESTERASE URINE: NEGATIVE
MICROSCOPIC-UA: NORMAL
NITRITE URINE: NEGATIVE
PH URINE: 7
PROTEIN URINE: NEGATIVE
RED BLOOD CELLS URINE: 0 /HPF
SPECIFIC GRAVITY URINE: 1
SQUAMOUS EPITHELIAL CELLS: 0 /HPF
UROBILINOGEN URINE: NORMAL
WHITE BLOOD CELLS URINE: 2 /HPF

## 2020-07-13 NOTE — ASSESSMENT
[FreeTextEntry1] : 44 year old M with perineal pain, LUTS and PFM spasm on exam consistent with Pelvic Floor Dysfunction. We discussed that treatment for pelvic floor dysfunction include physical therapy and daily diazepam suppositories. Patient was given referral to physical therapist knowledgeable in PFD and Rx for Valium suppositories was faxed for appropriate pharmacy. \par \par For BHD, recommend CT ABD w/wo IV contrast to eval for renal malignancy given the elevated risk with BHD.

## 2020-07-13 NOTE — PHYSICAL EXAM
[General Appearance - Well Nourished] : well nourished [General Appearance - Well Developed] : well developed [Well Groomed] : well groomed [Normal Appearance] : normal appearance [General Appearance - In No Acute Distress] : no acute distress [Edema] : no peripheral edema [Respiration, Rhythm And Depth] : normal respiratory rhythm and effort [Abdomen Tenderness] : non-tender [Exaggerated Use Of Accessory Muscles For Inspiration] : no accessory muscle use [Abdomen Soft] : soft [Costovertebral Angle Tenderness] : no ~M costovertebral angle tenderness [Urethral Meatus] : meatus normal [Testes Mass (___cm)] : there were no testicular masses [Scrotum] : the scrotum was normal [Urinary Bladder Findings] : the bladder was normal on palpation [No Prostate Nodules] : no prostate nodules [FreeTextEntry1] : PFMs 3/4 (R>L) with +MTrPs b/l levators all groups, b/l OI and transverse perineii.  [Normal Station and Gait] : the gait and station were normal for the patient's age [No Focal Deficits] : no focal deficits [] : no rash [Oriented To Time, Place, And Person] : oriented to person, place, and time [Mood] : the mood was normal [Affect] : the affect was normal [No Palpable Adenopathy] : no palpable adenopathy [Not Anxious] : not anxious

## 2020-07-14 ENCOUNTER — APPOINTMENT (OUTPATIENT)
Dept: CT IMAGING | Facility: CLINIC | Age: 45
End: 2020-07-14

## 2020-08-25 ENCOUNTER — APPOINTMENT (OUTPATIENT)
Dept: CT IMAGING | Facility: CLINIC | Age: 45
End: 2020-08-25
Payer: COMMERCIAL

## 2020-08-25 ENCOUNTER — OUTPATIENT (OUTPATIENT)
Dept: OUTPATIENT SERVICES | Facility: HOSPITAL | Age: 45
LOS: 1 days | End: 2020-08-25
Payer: COMMERCIAL

## 2020-08-25 DIAGNOSIS — Q87.89 OTHER SPECIFIED CONGENITAL MALFORMATION SYNDROMES, NOT ELSEWHERE CLASSIFIED: ICD-10-CM

## 2020-08-25 PROCEDURE — 82565 ASSAY OF CREATININE: CPT

## 2020-08-25 PROCEDURE — 74170 CT ABD WO CNTRST FLWD CNTRST: CPT

## 2020-08-25 PROCEDURE — 74170 CT ABD WO CNTRST FLWD CNTRST: CPT | Mod: 26

## 2020-09-15 ENCOUNTER — APPOINTMENT (OUTPATIENT)
Dept: UROLOGY | Facility: CLINIC | Age: 45
End: 2020-09-15
Payer: COMMERCIAL

## 2020-09-15 VITALS — TEMPERATURE: 98.2 F

## 2020-09-15 PROCEDURE — 99213 OFFICE O/P EST LOW 20 MIN: CPT

## 2020-09-15 NOTE — PHYSICAL EXAM
[General Appearance - Well Nourished] : well nourished [Normal Appearance] : normal appearance [General Appearance - Well Developed] : well developed [Well Groomed] : well groomed [Abdomen Soft] : soft [General Appearance - In No Acute Distress] : no acute distress [Costovertebral Angle Tenderness] : no ~M costovertebral angle tenderness [Abdomen Tenderness] : non-tender [Edema] : no peripheral edema [] : no respiratory distress [Exaggerated Use Of Accessory Muscles For Inspiration] : no accessory muscle use [Respiration, Rhythm And Depth] : normal respiratory rhythm and effort [Mood] : the mood was normal [Oriented To Time, Place, And Person] : oriented to person, place, and time [Affect] : the affect was normal [Normal Station and Gait] : the gait and station were normal for the patient's age [Not Anxious] : not anxious [No Focal Deficits] : no focal deficits [Urinary Bladder Findings] : the bladder was normal on palpation

## 2020-09-15 NOTE — ASSESSMENT
[FreeTextEntry1] : 44 year old M with perineal pain, LUTS w/ Pelvic Floor Dysfunction. \par Pt is doing physical therapy which is helping along with CBD use and stretching exercises. 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 only one simple renal cyst. Repeat imaging in 3 years.

## 2020-09-15 NOTE — HISTORY OF PRESENT ILLNESS
[FreeTextEntry1] : 44 year old man seen 07/10/2020 with complaint of frequency, urgency, hesitancy, weak stream, straining to voi and perineal pain. He has been diagnosed with prostatitis in the past. No improvement with abx. This began years ago.  \par It is moderate in severity. Nothing makes the symptoms better, nothing makes sx worse. \par It is associated with constipation and anxiety.\par No hematuria, no dysuria, no frequency, no urgency, no hesitancy, no straining. No incontinence. \par No fevers, no chills, no nausea, no vomiting, no flank pain. \par Of note, he has family history of [place] Evangelina Daniella and has it himself. \par \par 9/15/2020: Presents returns for f/u. Reports he is doing better after starting physical therapy. His urinary symptoms are better controlled along with CBD use and stretching exercises. \par Pt's CT scan showed a renal cyst to workup for [place] Evangelina Daniella syndrome. No renal lesions concerning for malignancy

## 2020-10-29 ENCOUNTER — APPOINTMENT (OUTPATIENT)
Dept: INTERNAL MEDICINE | Facility: CLINIC | Age: 45
End: 2020-10-29
Payer: COMMERCIAL

## 2020-10-29 VITALS
RESPIRATION RATE: 18 BRPM | SYSTOLIC BLOOD PRESSURE: 110 MMHG | WEIGHT: 138 LBS | TEMPERATURE: 98.2 F | HEIGHT: 70 IN | HEART RATE: 60 BPM | OXYGEN SATURATION: 97 % | BODY MASS INDEX: 19.76 KG/M2 | DIASTOLIC BLOOD PRESSURE: 68 MMHG

## 2020-10-29 DIAGNOSIS — M62.89 OTHER SPECIFIED DISORDERS OF MUSCLE: ICD-10-CM

## 2020-10-29 PROCEDURE — 99072 ADDL SUPL MATRL&STAF TM PHE: CPT

## 2020-10-29 PROCEDURE — 99214 OFFICE O/P EST MOD 30 MIN: CPT | Mod: 25

## 2020-10-29 NOTE — PLAN
[FreeTextEntry1] : 1. Continue with medication as outlined above.\par \par 2. We'll now institute a trial of Zomig 2.5 mg p.r.n. for treatment of the apparent cluster headaches. He can repeat this dose once after 2 hours if the headache does not resolve. He will also discuss the headaches with his neurologist when he is reevaluated in the next several weeks for his multiple sclerosis\par \par 3. The patient refuses a flu shot\par \par 4. Follow up with myself in 6 months with a wellness evaluation yearly with her.\par \par 5. The patient will return for followup on her function studies next week.

## 2020-10-29 NOTE — HISTORY OF PRESENT ILLNESS
[de-identified] : Patient comes in today for a followup evaluation and reassessment. There are several issues to discuss.\par \par The patient states that he has been having issues with urinary urgency and frequency. He feels as if he frequently has to urinate and he always has a sensation as if he needs to do this. His symptoms appear to be worse and more pronounced after sexual intercourse. He underwent urologic evaluation by Dr. Coelho, and he diagnosed her with pelvic floor dysfunction. The patient is now going to physical therapy to help in this regard.\par \par With regard to his underlying pulmonary issues, he remains stable. He denies any cough, chest pain, or pleuritic pain. There has been no wheeze. He does occasionally have mild seasonal type allergies symptoms. He has been exercising every bicycle for 12 miles, 4-5 times a week without difficulty.\par \par The patient has been complaining of headaches. He has had these for over 30 years. They appear to come on in groups for several days at a time. He has not discussed this with his neurologist. He is scheduled to followup with neurology in the next several weeks, for his usual assessment due to the multiple sclerosis. He now comes in for this assessment.

## 2020-10-30 LAB — SARS-COV-2 N GENE NPH QL NAA+PROBE: NOT DETECTED

## 2020-11-02 ENCOUNTER — APPOINTMENT (OUTPATIENT)
Dept: INTERNAL MEDICINE | Facility: CLINIC | Age: 45
End: 2020-11-02
Payer: COMMERCIAL

## 2020-11-02 ENCOUNTER — NON-APPOINTMENT (OUTPATIENT)
Age: 45
End: 2020-11-02

## 2020-11-02 VITALS
TEMPERATURE: 98 F | HEART RATE: 58 BPM | DIASTOLIC BLOOD PRESSURE: 72 MMHG | SYSTOLIC BLOOD PRESSURE: 118 MMHG | RESPIRATION RATE: 18 BRPM | WEIGHT: 139 LBS | BODY MASS INDEX: 19.9 KG/M2 | HEIGHT: 70 IN | OXYGEN SATURATION: 99 %

## 2020-11-02 PROCEDURE — 94727 GAS DIL/WSHOT DETER LNG VOL: CPT

## 2020-11-02 PROCEDURE — 94729 DIFFUSING CAPACITY: CPT

## 2020-11-02 PROCEDURE — 99072 ADDL SUPL MATRL&STAF TM PHE: CPT

## 2020-11-03 ENCOUNTER — NON-APPOINTMENT (OUTPATIENT)
Age: 45
End: 2020-11-03

## 2020-12-22 NOTE — HISTORY OF PRESENT ILLNESS
[FreeTextEntry1] : 44 year old man seen 07/10/2020 with complaint of frequency, urgency, hesitancy, weak stream, straining to voi and perineal pain. He has been diagnosed with prostatitis in the past. No improvement with abx. This began years ago.  \par It is moderate in severity. Nothing makes the symptoms better, nothing makes sx worse. \par It is associated with constipation and anxiety.\par No hematuria, no dysuria, no frequency, no urgency, no hesitancy, no straining. No incontinence. \par No fevers, no chills, no nausea, no vomiting, no flank pain. \par \par Of note, he has family history of [place] Evangelina Brewer and has it himself. 
Gastroenterology

## 2021-02-23 ENCOUNTER — APPOINTMENT (OUTPATIENT)
Dept: INTERNAL MEDICINE | Facility: CLINIC | Age: 46
End: 2021-02-23
Payer: COMMERCIAL

## 2021-02-23 ENCOUNTER — NON-APPOINTMENT (OUTPATIENT)
Age: 46
End: 2021-02-23

## 2021-02-23 VITALS
WEIGHT: 142 LBS | HEART RATE: 58 BPM | TEMPERATURE: 98.7 F | SYSTOLIC BLOOD PRESSURE: 102 MMHG | HEIGHT: 70 IN | OXYGEN SATURATION: 97 % | DIASTOLIC BLOOD PRESSURE: 60 MMHG | BODY MASS INDEX: 20.33 KG/M2 | RESPIRATION RATE: 16 BRPM

## 2021-02-23 DIAGNOSIS — Z20.822 CONTACT WITH AND (SUSPECTED) EXPOSURE TO COVID-19: ICD-10-CM

## 2021-02-23 DIAGNOSIS — R93.89 ABNORMAL FINDINGS ON DIAGNOSTIC IMAGING OF OTHER SPECIFIED BODY STRUCTURES: ICD-10-CM

## 2021-02-23 PROCEDURE — 99072 ADDL SUPL MATRL&STAF TM PHE: CPT

## 2021-02-23 PROCEDURE — 99214 OFFICE O/P EST MOD 30 MIN: CPT

## 2021-02-23 RX ORDER — DIAZEPAM 10 MG/1
10 TABLET ORAL
Qty: 30 | Refills: 5 | Status: DISCONTINUED | COMMUNITY
Start: 2020-07-10 | End: 2021-02-23

## 2021-02-23 RX ORDER — CIPROFLOXACIN HYDROCHLORIDE 500 MG/1
500 TABLET, FILM COATED ORAL
Qty: 14 | Refills: 1 | Status: COMPLETED | COMMUNITY
Start: 2020-07-08 | End: 2021-02-23

## 2021-02-23 NOTE — PHYSICAL EXAM
[No Acute Distress] : no acute distress [Well Nourished] : well nourished [Well Developed] : well developed [Supple] : supple [No Respiratory Distress] : no respiratory distress  [No Accessory Muscle Use] : no accessory muscle use [Normal Rate] : normal rate  [Regular Rhythm] : with a regular rhythm [Normal S1, S2] : normal S1 and S2 [Normal Posterior Cervical Nodes] : no posterior cervical lymphadenopathy [Normal Anterior Cervical Nodes] : no anterior cervical lymphadenopathy [Normal Affect] : the affect was normal [Alert and Oriented x3] : oriented to person, place, and time [Normal Insight/Judgement] : insight and judgment were intact

## 2021-02-24 ENCOUNTER — APPOINTMENT (OUTPATIENT)
Dept: CT IMAGING | Facility: CLINIC | Age: 46
End: 2021-02-24

## 2021-02-24 NOTE — ASSESSMENT
[FreeTextEntry1] : discussed case with Dr. Rosales \par Will send for CT chest Tereza, tomorrow 8 am \par It symptoms worsen , pt will go to emergency room

## 2021-02-24 NOTE — HISTORY OF PRESENT ILLNESS
[FreeTextEntry8] : Pt presents  to the office today with complaints of left sided pleuritic pain. 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 He did receive the second Moderna shot on Friday and was ill Saturday and Sunday with myalgias, chills, fatigue . States he felt like " my whole body  hurt."  He is unsure if this is related. He reports he always has mild shortness fo breath.' He did go to  Urgent Care yesterday , had CXRY which revealed no focal opacities or pleural effusion or pneumothorax. LAst CT chest done 10/19 showed few  bilateral lung cysts , right lower lobe atelectasis. Denies fever, shortness of breath, cough , purulent sputum. \par

## 2021-02-24 NOTE — REVIEW OF SYSTEMS
[Negative] : Psychiatric [Fever] : no fever [Chills] : no chills [Fatigue] : no fatigue [Shortness Of Breath] : no shortness of breath [Wheezing] : no wheezing [Cough] : no cough [FreeTextEntry6] : see HPI

## 2021-02-28 ENCOUNTER — TRANSCRIPTION ENCOUNTER (OUTPATIENT)
Age: 46
End: 2021-02-28

## 2021-03-01 ENCOUNTER — APPOINTMENT (OUTPATIENT)
Dept: CT IMAGING | Facility: CLINIC | Age: 46
End: 2021-03-01
Payer: COMMERCIAL

## 2021-03-01 ENCOUNTER — OUTPATIENT (OUTPATIENT)
Dept: OUTPATIENT SERVICES | Facility: HOSPITAL | Age: 46
LOS: 1 days | End: 2021-03-01
Payer: COMMERCIAL

## 2021-03-01 DIAGNOSIS — Q87.89 OTHER SPECIFIED CONGENITAL MALFORMATION SYNDROMES, NOT ELSEWHERE CLASSIFIED: ICD-10-CM

## 2021-03-01 PROCEDURE — 71250 CT THORAX DX C-: CPT | Mod: 26

## 2021-03-01 PROCEDURE — 71250 CT THORAX DX C-: CPT

## 2021-03-03 DIAGNOSIS — R09.1 PLEURISY: ICD-10-CM

## 2021-03-05 ENCOUNTER — NON-APPOINTMENT (OUTPATIENT)
Age: 46
End: 2021-03-05

## 2021-03-09 ENCOUNTER — NON-APPOINTMENT (OUTPATIENT)
Age: 46
End: 2021-03-09

## 2021-03-09 ENCOUNTER — APPOINTMENT (OUTPATIENT)
Dept: CARDIOLOGY | Facility: CLINIC | Age: 46
End: 2021-03-09
Payer: COMMERCIAL

## 2021-03-09 VITALS
OXYGEN SATURATION: 100 % | HEIGHT: 70 IN | DIASTOLIC BLOOD PRESSURE: 78 MMHG | BODY MASS INDEX: 20.04 KG/M2 | WEIGHT: 140 LBS | SYSTOLIC BLOOD PRESSURE: 128 MMHG | HEART RATE: 67 BPM

## 2021-03-09 DIAGNOSIS — R07.89 OTHER CHEST PAIN: ICD-10-CM

## 2021-03-09 PROCEDURE — 99072 ADDL SUPL MATRL&STAF TM PHE: CPT

## 2021-03-09 PROCEDURE — 93000 ELECTROCARDIOGRAM COMPLETE: CPT

## 2021-03-09 PROCEDURE — 99245 OFF/OP CONSLTJ NEW/EST HI 55: CPT

## 2021-03-11 ENCOUNTER — TRANSCRIPTION ENCOUNTER (OUTPATIENT)
Age: 46
End: 2021-03-11

## 2021-03-11 NOTE — PHYSICAL EXAM
[General Appearance - Well Developed] : well developed [Normal Appearance] : normal appearance [Well Groomed] : well groomed [General Appearance - Well Nourished] : well nourished [No Deformities] : no deformities [General Appearance - In No Acute Distress] : no acute distress [Normal Conjunctiva] : the conjunctiva exhibited no abnormalities [Eyelids - No Xanthelasma] : the eyelids demonstrated no xanthelasmas [Normal Oral Mucosa] : normal oral mucosa [No Oral Pallor] : no oral pallor [No Oral Cyanosis] : no oral cyanosis [Normal Jugular Venous A Waves Present] : normal jugular venous A waves present [Normal Jugular Venous V Waves Present] : normal jugular venous V waves present [No Jugular Venous Adame A Waves] : no jugular venous adame A waves [Heart Rate And Rhythm] : heart rate and rhythm were normal [Heart Sounds] : normal S1 and S2 [Murmurs] : no murmurs present [Respiration, Rhythm And Depth] : normal respiratory rhythm and effort [Exaggerated Use Of Accessory Muscles For Inspiration] : no accessory muscle use [Auscultation Breath Sounds / Voice Sounds] : lungs were clear to auscultation bilaterally [Abdomen Soft] : soft [Abdomen Tenderness] : non-tender [Abdomen Mass (___ Cm)] : no abdominal mass palpated [Abnormal Walk] : normal gait [Gait - Sufficient For Exercise Testing] : the gait was sufficient for exercise testing [Nail Clubbing] : no clubbing of the fingernails [Cyanosis, Localized] : no localized cyanosis [Petechial Hemorrhages (___cm)] : no petechial hemorrhages [Skin Color & Pigmentation] : normal skin color and pigmentation [] : no rash [No Venous Stasis] : no venous stasis [Skin Lesions] : no skin lesions [No Skin Ulcers] : no skin ulcer [No Xanthoma] : no  xanthoma was observed [Oriented To Time, Place, And Person] : oriented to person, place, and time [Affect] : the affect was normal [Mood] : the mood was normal [No Anxiety] : not feeling anxious

## 2021-03-11 NOTE — HISTORY OF PRESENT ILLNESS
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 \par \par No prior history of cardiac diseases, testing or procedures/surgeries.\par No family history of cardiac disease.\par No smoking, ETOH, illicits.\par moderately active no regular exercise\par \par H/o spontaneous pneumothorax requiring pleuredsis a few years ago.\par \par \par ECG WNL\par \par Qpvs-Xplu-Hvba Syndrome-autosomal dominant condition\par w/ benign skin harmartomas, pulmonary cysts & spontanous PTX,\par & increased risk of renal Ca.\par

## 2021-03-11 NOTE — ASSESSMENT
[FreeTextEntry1] : \par \par A/P:\par \par *Atypical chest pain\par -unlikely due to coronary disease \par but exertional component described.\par -EST & echo ordered.\par \par Followup in 2-3 weeks scheduled to review results.

## 2021-03-15 ENCOUNTER — APPOINTMENT (OUTPATIENT)
Dept: CARDIOLOGY | Facility: CLINIC | Age: 46
End: 2021-03-15
Payer: COMMERCIAL

## 2021-03-15 PROCEDURE — 99072 ADDL SUPL MATRL&STAF TM PHE: CPT

## 2021-03-15 PROCEDURE — 93015 CV STRESS TEST SUPVJ I&R: CPT

## 2021-03-29 ENCOUNTER — APPOINTMENT (OUTPATIENT)
Dept: CARDIOLOGY | Facility: CLINIC | Age: 46
End: 2021-03-29
Payer: COMMERCIAL

## 2021-03-29 PROCEDURE — 99072 ADDL SUPL MATRL&STAF TM PHE: CPT

## 2021-03-29 PROCEDURE — 93306 TTE W/DOPPLER COMPLETE: CPT

## 2021-04-06 ENCOUNTER — APPOINTMENT (OUTPATIENT)
Dept: CARDIOLOGY | Facility: CLINIC | Age: 46
End: 2021-04-06
Payer: COMMERCIAL

## 2021-04-06 VITALS
HEIGHT: 70 IN | WEIGHT: 138 LBS | OXYGEN SATURATION: 98 % | BODY MASS INDEX: 19.76 KG/M2 | HEART RATE: 68 BPM | DIASTOLIC BLOOD PRESSURE: 68 MMHG | SYSTOLIC BLOOD PRESSURE: 110 MMHG

## 2021-04-06 PROCEDURE — 99215 OFFICE O/P EST HI 40 MIN: CPT

## 2021-04-06 PROCEDURE — 99072 ADDL SUPL MATRL&STAF TM PHE: CPT

## 2021-04-06 NOTE — PHYSICAL EXAM
[General Appearance - Well Developed] : well developed [Normal Appearance] : normal appearance [Well Groomed] : well groomed [General Appearance - Well Nourished] : well nourished [No Deformities] : no deformities [General Appearance - In No Acute Distress] : no acute distress [Normal Conjunctiva] : the conjunctiva exhibited no abnormalities [Eyelids - No Xanthelasma] : the eyelids demonstrated no xanthelasmas [Normal Oral Mucosa] : normal oral mucosa [No Oral Pallor] : no oral pallor [No Oral Cyanosis] : no oral cyanosis [Normal Jugular Venous A Waves Present] : normal jugular venous A waves present [Normal Jugular Venous V Waves Present] : normal jugular venous V waves present [No Jugular Venous Adame A Waves] : no jugular venous adame A waves [Respiration, Rhythm And Depth] : normal respiratory rhythm and effort [Exaggerated Use Of Accessory Muscles For Inspiration] : no accessory muscle use [Auscultation Breath Sounds / Voice Sounds] : lungs were clear to auscultation bilaterally [Heart Rate And Rhythm] : heart rate and rhythm were normal [Heart Sounds] : normal S1 and S2 [Murmurs] : no murmurs present [Abdomen Soft] : soft [Abdomen Tenderness] : non-tender [Abdomen Mass (___ Cm)] : no abdominal mass palpated [Abnormal Walk] : normal gait [Gait - Sufficient For Exercise Testing] : the gait was sufficient for exercise testing [Nail Clubbing] : no clubbing of the fingernails [Cyanosis, Localized] : no localized cyanosis [Petechial Hemorrhages (___cm)] : no petechial hemorrhages [Skin Color & Pigmentation] : normal skin color and pigmentation [] : no rash [No Venous Stasis] : no venous stasis [Skin Lesions] : no skin lesions [No Skin Ulcers] : no skin ulcer [No Xanthoma] : no  xanthoma was observed [Oriented To Time, Place, And Person] : oriented to person, place, and time [Affect] : the affect was normal [Mood] : the mood was normal [No Anxiety] : not feeling anxious

## 2021-04-06 NOTE — ASSESSMENT
[FreeTextEntry1] : A/P:\par \par *Atypical chest pain\par -noncardiac in etiology likely due to underlying lung disease.\par -no further followup indicated.  Instructed pt\par on symptoms typical of angina that should prompt cardiac workup.\par

## 2021-04-06 NOTE — HISTORY OF PRESENT ILLNESS
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 Clearly pleurtic on history.\par

## 2021-06-11 ENCOUNTER — NON-APPOINTMENT (OUTPATIENT)
Age: 46
End: 2021-06-11

## 2021-08-09 ENCOUNTER — TRANSCRIPTION ENCOUNTER (OUTPATIENT)
Age: 46
End: 2021-08-09

## 2021-08-17 ENCOUNTER — LABORATORY RESULT (OUTPATIENT)
Age: 46
End: 2021-08-17

## 2021-08-22 LAB
ALBUMIN SERPL ELPH-MCNC: 4.5 G/DL
ALP BLD-CCNC: 65 U/L
ALT SERPL-CCNC: 6 U/L
ANION GAP SERPL CALC-SCNC: 10 MMOL/L
APPEARANCE: CLEAR
AST SERPL-CCNC: 15 U/L
BACTERIA: NEGATIVE
BASOPHILS # BLD AUTO: 0.12 K/UL
BASOPHILS NFR BLD AUTO: 2.6 %
BILIRUB SERPL-MCNC: 1.4 MG/DL
BILIRUBIN URINE: NEGATIVE
BLOOD URINE: NEGATIVE
BUN SERPL-MCNC: 11 MG/DL
CALCIUM SERPL-MCNC: 9.5 MG/DL
CHLORIDE SERPL-SCNC: 104 MMOL/L
CHOLEST SERPL-MCNC: 134 MG/DL
CO2 SERPL-SCNC: 27 MMOL/L
COLOR: YELLOW
CREAT SERPL-MCNC: 0.93 MG/DL
EOSINOPHIL # BLD AUTO: 0.27 K/UL
EOSINOPHIL NFR BLD AUTO: 6.1 %
GLUCOSE QUALITATIVE U: NEGATIVE
GLUCOSE SERPL-MCNC: 83 MG/DL
HCT VFR BLD CALC: 38.4 %
HDLC SERPL-MCNC: 54 MG/DL
HGB BLD-MCNC: 11.1 G/DL
HYALINE CASTS: 1 /LPF
KETONES URINE: NEGATIVE
LDLC SERPL CALC-MCNC: 70 MG/DL
LEUKOCYTE ESTERASE URINE: NEGATIVE
LYMPHOCYTES # BLD AUTO: 1.01 K/UL
LYMPHOCYTES NFR BLD AUTO: 22.6 %
MAN DIFF?: NORMAL
MCHC RBC-ENTMCNC: 17.4 PG
MCHC RBC-ENTMCNC: 28.9 GM/DL
MCV RBC AUTO: 60.2 FL
MICROSCOPIC-UA: NORMAL
MONOCYTES # BLD AUTO: 0.16 K/UL
MONOCYTES NFR BLD AUTO: 3.5 %
NEUTROPHILS # BLD AUTO: 2.9 K/UL
NEUTROPHILS NFR BLD AUTO: 65.2 %
NITRITE URINE: NEGATIVE
NONHDLC SERPL-MCNC: 79 MG/DL
PH URINE: 6
PLATELET # BLD AUTO: 244 K/UL
POTASSIUM SERPL-SCNC: 3.8 MMOL/L
PROT SERPL-MCNC: 6.5 G/DL
PROTEIN URINE: NORMAL
RBC # BLD: 6.38 M/UL
RBC # FLD: 18.7 %
RED BLOOD CELLS URINE: 2 /HPF
SODIUM SERPL-SCNC: 141 MMOL/L
SPECIFIC GRAVITY URINE: 1.02
SQUAMOUS EPITHELIAL CELLS: 0 /HPF
T3FREE SERPL-MCNC: 3.06 PG/ML
T3RU NFR SERPL: 1 TBI
T4 FREE SERPL-MCNC: 1.2 NG/DL
T4 SERPL-MCNC: 6.3 UG/DL
TRIGL SERPL-MCNC: 49 MG/DL
TSH SERPL-ACNC: 1.02 UIU/ML
UROBILINOGEN URINE: ABNORMAL
WBC # FLD AUTO: 4.45 K/UL
WHITE BLOOD CELLS URINE: 0 /HPF

## 2021-08-24 ENCOUNTER — NON-APPOINTMENT (OUTPATIENT)
Age: 46
End: 2021-08-24

## 2021-08-24 ENCOUNTER — APPOINTMENT (OUTPATIENT)
Dept: INTERNAL MEDICINE | Facility: CLINIC | Age: 46
End: 2021-08-24
Payer: COMMERCIAL

## 2021-08-24 VITALS
SYSTOLIC BLOOD PRESSURE: 100 MMHG | WEIGHT: 145 LBS | RESPIRATION RATE: 16 BRPM | HEART RATE: 64 BPM | HEIGHT: 70 IN | DIASTOLIC BLOOD PRESSURE: 70 MMHG | OXYGEN SATURATION: 97 % | BODY MASS INDEX: 20.76 KG/M2 | TEMPERATURE: 98.3 F

## 2021-08-24 DIAGNOSIS — Z23 ENCOUNTER FOR IMMUNIZATION: ICD-10-CM

## 2021-08-24 PROCEDURE — 99214 OFFICE O/P EST MOD 30 MIN: CPT | Mod: 25

## 2021-08-24 PROCEDURE — G0009: CPT

## 2021-08-24 PROCEDURE — 90732 PPSV23 VACC 2 YRS+ SUBQ/IM: CPT

## 2021-08-24 PROCEDURE — 99396 PREV VISIT EST AGE 40-64: CPT | Mod: 25

## 2021-08-24 NOTE — PHYSICAL EXAM
[Coordination Grossly Intact] : coordination grossly intact [Normal Gait] : normal gait [Normal Affect] : the affect was normal [Normal Insight/Judgement] : insight and judgment were intact [General Appearance - In No Acute Distress] : in no acute distress [General Appearance - Alert] : alert [Sclera] : the sclera and conjunctiva were normal [PERRL With Normal Accommodation] : pupils were equal in size, round, and reactive to light [Extraocular Movements] : extraocular movements were intact [Outer Ear] : the ears and nose were normal in appearance [Oropharynx] : the oropharynx was normal [Neck Appearance] : the appearance of the neck was normal [Neck Cervical Mass (___cm)] : no neck mass was observed [Jugular Venous Distention Increased] : there was no jugular-venous distention [Thyroid Diffuse Enlargement] : the thyroid was not enlarged [Thyroid Nodule] : there were no palpable thyroid nodules [Auscultation Breath Sounds / Voice Sounds] : lungs were clear to auscultation bilaterally [Heart Rate And Rhythm] : heart rate was normal and rhythm regular [Heart Sounds] : normal S1 and S2 [Heart Sounds Gallop] : no gallops [Murmurs] : no murmurs [Heart Sounds Pericardial Friction Rub] : no pericardial rub [Arterial Pulses Carotid] : carotid pulses were normal with no bruits [Edema] : there was no peripheral edema [Bowel Sounds] : normal bowel sounds [Abdomen Soft] : soft [Abdomen Tenderness] : non-tender [] : no hepato-splenomegaly [Abdomen Mass (___ Cm)] : no abdominal mass palpated [No CVA Tenderness] : no ~M costovertebral angle tenderness [No Spinal Tenderness] : no spinal tenderness [Nail Clubbing] : no clubbing  or cyanosis of the fingernails [FreeTextEntry1] : 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

## 2021-08-24 NOTE — PLAN
[FreeTextEntry1] : Continued medications as outlined above.\par \par 2. The patient will undergo a repeat CBC with iron studies in one month. This will be to evaluate his anemia.\par \par 3. The patient has been referred to Dr. Youssef, for a baseline screening colonoscopy given his age.\par \par 4. Pneumovax 23 has been administered\par \par 5. Followup in 6 months with full pulmonary function testing.\par \par 6. Maintain current exercise regimen.

## 2021-08-24 NOTE — HISTORY OF PRESENT ILLNESS
[FreeTextEntry1] : The patient comes in for a yearly wellness evaluation\par  [de-identified] : The patient states that overall, he is doing fairly well. He he continues to be treated for his multiple sclerosis, with Ocrevus. He remains relatively stable. He continues to be followed by neurology. He does occasionally complain of headaches, for which he takes a trip then medication.\par \par The patient has been exercising. He denies any overt breathlessness. There has been no chest pain. He did undergo a CAT scan of the chest back in February of this year that showed the cystic changes consistent with Drkm-Ajpq-Rrzb syndrome. He denies any change in bowel habits. He has received the vaccination for the corona virus. He isn't having some issues in that he works as a  and he does have difficulty maintaining placement of the mask throughout the entire course of the day due to decreased difficulty breathing. He does not smoke cigarettes. He now comes in for this assessment.\par \par

## 2021-09-22 NOTE — PHYSICAL EXAM
[Time Spent: ___ minutes] : I have spent [unfilled] minutes of time on the encounter.
[FreeTextEntry3] : Skin examination performed of the face, neck, chest, hands, lower legs;\par The patient is well, alert and oriented, pleasant and cooperative.\par Eyelids, conjunctivae, oral mucosa, digits and nails all normal.  \par No cervical adenopathy.\par \par \par Scaling waxy stuck on papules; upper back;  largest on R scapula\par \par + tender, fluctuant subcutaneous nodule L neck near jaw angle

## 2021-12-09 ENCOUNTER — TRANSCRIPTION ENCOUNTER (OUTPATIENT)
Age: 46
End: 2021-12-09

## 2021-12-17 ENCOUNTER — NON-APPOINTMENT (OUTPATIENT)
Age: 46
End: 2021-12-17

## 2021-12-17 DIAGNOSIS — Z20.822 CONTACT WITH AND (SUSPECTED) EXPOSURE TO COVID-19: ICD-10-CM

## 2021-12-19 LAB — SARS-COV-2 N GENE NPH QL NAA+PROBE: NOT DETECTED

## 2021-12-25 ENCOUNTER — FORM ENCOUNTER (OUTPATIENT)
Age: 46
End: 2021-12-25

## 2021-12-25 DIAGNOSIS — R53.83 OTHER FATIGUE: ICD-10-CM

## 2021-12-26 ENCOUNTER — EMERGENCY (EMERGENCY)
Facility: HOSPITAL | Age: 46
LOS: 1 days | Discharge: HOME | End: 2021-12-26
Attending: STUDENT IN AN ORGANIZED HEALTH CARE EDUCATION/TRAINING PROGRAM
Payer: COMMERCIAL

## 2021-12-26 ENCOUNTER — TRANSCRIPTION ENCOUNTER (OUTPATIENT)
Age: 46
End: 2021-12-26

## 2021-12-26 VITALS
RESPIRATION RATE: 17 BRPM | DIASTOLIC BLOOD PRESSURE: 90 MMHG | OXYGEN SATURATION: 99 % | SYSTOLIC BLOOD PRESSURE: 132 MMHG | TEMPERATURE: 100 F | HEART RATE: 88 BPM

## 2021-12-26 VITALS
HEART RATE: 91 BPM | DIASTOLIC BLOOD PRESSURE: 76 MMHG | RESPIRATION RATE: 25 BRPM | WEIGHT: 139.99 LBS | SYSTOLIC BLOOD PRESSURE: 126 MMHG | OXYGEN SATURATION: 96 % | TEMPERATURE: 98 F | HEIGHT: 66 IN

## 2021-12-26 LAB
ALBUMIN SERPL ELPH-MCNC: 4.6 G/DL — SIGNIFICANT CHANGE UP (ref 3.3–5)
ALP SERPL-CCNC: 56 U/L — SIGNIFICANT CHANGE UP (ref 40–120)
ALT FLD-CCNC: 11 U/L — SIGNIFICANT CHANGE UP (ref 10–45)
ANION GAP SERPL CALC-SCNC: 14 MMOL/L — SIGNIFICANT CHANGE UP (ref 5–17)
ANISOCYTOSIS BLD QL: SIGNIFICANT CHANGE UP
AST SERPL-CCNC: 19 U/L — SIGNIFICANT CHANGE UP (ref 10–40)
BASE EXCESS BLDV CALC-SCNC: 1 MMOL/L — SIGNIFICANT CHANGE UP (ref -2–2)
BASOPHILS # BLD AUTO: 0 K/UL — SIGNIFICANT CHANGE UP (ref 0–0.2)
BASOPHILS NFR BLD AUTO: 0 % — SIGNIFICANT CHANGE UP (ref 0–2)
BILIRUB SERPL-MCNC: 1.5 MG/DL — HIGH (ref 0.2–1.2)
BUN SERPL-MCNC: 15 MG/DL — SIGNIFICANT CHANGE UP (ref 7–23)
CA-I SERPL-SCNC: 1.2 MMOL/L — SIGNIFICANT CHANGE UP (ref 1.15–1.33)
CALCIUM SERPL-MCNC: 9.1 MG/DL — SIGNIFICANT CHANGE UP (ref 8.4–10.5)
CHLORIDE BLDV-SCNC: 105 MMOL/L — SIGNIFICANT CHANGE UP (ref 96–108)
CHLORIDE SERPL-SCNC: 104 MMOL/L — SIGNIFICANT CHANGE UP (ref 96–108)
CO2 BLDV-SCNC: 28 MMOL/L — HIGH (ref 22–26)
CO2 SERPL-SCNC: 22 MMOL/L — SIGNIFICANT CHANGE UP (ref 22–31)
CREAT SERPL-MCNC: 0.84 MG/DL — SIGNIFICANT CHANGE UP (ref 0.5–1.3)
DACRYOCYTES BLD QL SMEAR: SLIGHT — SIGNIFICANT CHANGE UP
ELLIPTOCYTES BLD QL SMEAR: SIGNIFICANT CHANGE UP
EOSINOPHIL # BLD AUTO: 0 K/UL — SIGNIFICANT CHANGE UP (ref 0–0.5)
EOSINOPHIL NFR BLD AUTO: 0 % — SIGNIFICANT CHANGE UP (ref 0–6)
GAS PNL BLDV: 139 MMOL/L — SIGNIFICANT CHANGE UP (ref 136–145)
GAS PNL BLDV: SIGNIFICANT CHANGE UP
GAS PNL BLDV: SIGNIFICANT CHANGE UP
GLUCOSE BLDV-MCNC: 117 MG/DL — HIGH (ref 70–99)
GLUCOSE SERPL-MCNC: 120 MG/DL — HIGH (ref 70–99)
HCO3 BLDV-SCNC: 27 MMOL/L — SIGNIFICANT CHANGE UP (ref 22–29)
HCT VFR BLD CALC: 36.2 % — LOW (ref 39–50)
HCT VFR BLDA CALC: 30 % — LOW (ref 39–51)
HGB BLD CALC-MCNC: 10.1 G/DL — LOW (ref 12.6–17.4)
HGB BLD-MCNC: 11.1 G/DL — LOW (ref 13–17)
HYPOCHROMIA BLD QL: SLIGHT — SIGNIFICANT CHANGE UP
LACTATE BLDV-MCNC: 1.7 MMOL/L — SIGNIFICANT CHANGE UP (ref 0.7–2)
LYMPHOCYTES # BLD AUTO: 0.14 K/UL — LOW (ref 1–3.3)
LYMPHOCYTES # BLD AUTO: 2.6 % — LOW (ref 13–44)
MAGNESIUM SERPL-MCNC: 1.9 MG/DL — SIGNIFICANT CHANGE UP (ref 1.6–2.6)
MANUAL SMEAR VERIFICATION: SIGNIFICANT CHANGE UP
MCHC RBC-ENTMCNC: 17.8 PG — LOW (ref 27–34)
MCHC RBC-ENTMCNC: 30.7 GM/DL — LOW (ref 32–36)
MCV RBC AUTO: 58.2 FL — LOW (ref 80–100)
MICROCYTES BLD QL: SIGNIFICANT CHANGE UP
MONOCYTES # BLD AUTO: 0.61 K/UL — SIGNIFICANT CHANGE UP (ref 0–0.9)
MONOCYTES NFR BLD AUTO: 11.4 % — SIGNIFICANT CHANGE UP (ref 2–14)
NEUTROPHILS # BLD AUTO: 4.64 K/UL — SIGNIFICANT CHANGE UP (ref 1.8–7.4)
NEUTROPHILS NFR BLD AUTO: 86 % — HIGH (ref 43–77)
PCO2 BLDV: 46 MMHG — SIGNIFICANT CHANGE UP (ref 42–55)
PH BLDV: 7.37 — SIGNIFICANT CHANGE UP (ref 7.32–7.43)
PLAT MORPH BLD: NORMAL — SIGNIFICANT CHANGE UP
PLATELET # BLD AUTO: 192 K/UL — SIGNIFICANT CHANGE UP (ref 150–400)
PO2 BLDV: 43 MMHG — SIGNIFICANT CHANGE UP (ref 25–45)
POIKILOCYTOSIS BLD QL AUTO: SIGNIFICANT CHANGE UP
POLYCHROMASIA BLD QL SMEAR: SLIGHT — SIGNIFICANT CHANGE UP
POTASSIUM BLDV-SCNC: 3.6 MMOL/L — SIGNIFICANT CHANGE UP (ref 3.5–5.1)
POTASSIUM SERPL-MCNC: 4.3 MMOL/L — SIGNIFICANT CHANGE UP (ref 3.5–5.3)
POTASSIUM SERPL-SCNC: 4.3 MMOL/L — SIGNIFICANT CHANGE UP (ref 3.5–5.3)
PROT SERPL-MCNC: 6.5 G/DL — SIGNIFICANT CHANGE UP (ref 6–8.3)
RAPID RVP RESULT: DETECTED
RBC # BLD: 6.22 M/UL — HIGH (ref 4.2–5.8)
RBC # FLD: 17.8 % — HIGH (ref 10.3–14.5)
RBC BLD AUTO: ABNORMAL
SAO2 % BLDV: 76.9 % — SIGNIFICANT CHANGE UP (ref 67–88)
SARS-COV-2 RNA SPEC QL NAA+PROBE: DETECTED
SCHISTOCYTES BLD QL AUTO: SLIGHT — SIGNIFICANT CHANGE UP
SODIUM SERPL-SCNC: 140 MMOL/L — SIGNIFICANT CHANGE UP (ref 135–145)
TARGETS BLD QL SMEAR: SLIGHT — SIGNIFICANT CHANGE UP
WBC # BLD: 5.39 K/UL — SIGNIFICANT CHANGE UP (ref 3.8–10.5)
WBC # FLD AUTO: 5.39 K/UL — SIGNIFICANT CHANGE UP (ref 3.8–10.5)

## 2021-12-26 PROCEDURE — 36415 COLL VENOUS BLD VENIPUNCTURE: CPT

## 2021-12-26 PROCEDURE — 83735 ASSAY OF MAGNESIUM: CPT

## 2021-12-26 PROCEDURE — 83605 ASSAY OF LACTIC ACID: CPT

## 2021-12-26 PROCEDURE — 84295 ASSAY OF SERUM SODIUM: CPT

## 2021-12-26 PROCEDURE — 71045 X-RAY EXAM CHEST 1 VIEW: CPT | Mod: 26

## 2021-12-26 PROCEDURE — 82330 ASSAY OF CALCIUM: CPT

## 2021-12-26 PROCEDURE — 82947 ASSAY GLUCOSE BLOOD QUANT: CPT

## 2021-12-26 PROCEDURE — 82435 ASSAY OF BLOOD CHLORIDE: CPT

## 2021-12-26 PROCEDURE — 87040 BLOOD CULTURE FOR BACTERIA: CPT

## 2021-12-26 PROCEDURE — 80053 COMPREHEN METABOLIC PANEL: CPT

## 2021-12-26 PROCEDURE — 84132 ASSAY OF SERUM POTASSIUM: CPT

## 2021-12-26 PROCEDURE — 85014 HEMATOCRIT: CPT

## 2021-12-26 PROCEDURE — 85018 HEMOGLOBIN: CPT

## 2021-12-26 PROCEDURE — 99284 EMERGENCY DEPT VISIT MOD MDM: CPT

## 2021-12-26 PROCEDURE — 0225U NFCT DS DNA&RNA 21 SARSCOV2: CPT

## 2021-12-26 PROCEDURE — 99283 EMERGENCY DEPT VISIT LOW MDM: CPT | Mod: 25

## 2021-12-26 PROCEDURE — 82803 BLOOD GASES ANY COMBINATION: CPT

## 2021-12-26 PROCEDURE — 71045 X-RAY EXAM CHEST 1 VIEW: CPT

## 2021-12-26 PROCEDURE — 85025 COMPLETE CBC W/AUTO DIFF WBC: CPT

## 2021-12-26 PROCEDURE — 82565 ASSAY OF CREATININE: CPT

## 2021-12-26 RX ORDER — SODIUM CHLORIDE 9 MG/ML
1000 INJECTION INTRAMUSCULAR; INTRAVENOUS; SUBCUTANEOUS ONCE
Refills: 0 | Status: COMPLETED | OUTPATIENT
Start: 2021-12-26 | End: 2021-12-26

## 2021-12-26 RX ORDER — ACETAMINOPHEN 500 MG
650 TABLET ORAL ONCE
Refills: 0 | Status: DISCONTINUED | OUTPATIENT
Start: 2021-12-26 | End: 2021-12-29

## 2021-12-26 RX ADMIN — SODIUM CHLORIDE 1000 MILLILITER(S): 9 INJECTION INTRAMUSCULAR; INTRAVENOUS; SUBCUTANEOUS at 09:49

## 2021-12-26 NOTE — ED ADULT NURSE NOTE - OBJECTIVE STATEMENT
47 y/o male from home coming to the ED for fever/cough for 2 days.  PMH chronic lung disease, MS, pt states he is a  with multiple covid cases, pt states fever and cough started 12/24, pt states headache, neck pain and body aches today.  Pt denies n/v at this time, states nausea at home previously, denies diarrhea.  Pt states chest pain on deep inspiration and cough, some SOB on ambulation.  Pt is A&Ox4, equal unlabored breathing at rest, abd soft, tender to touch, skin warm dry and intact

## 2021-12-26 NOTE — ED PROVIDER NOTE - NS ED ROS FT
GENERAL: + fever  EYES: no eye pain  HEENT: no neck stiffness  CARDIAC: no chest pain  PULMONARY: no SOB  GI: no abdominal pain  : no dysuria  SKIN: no rashes  NEURO: + headache  MSK: + myalgias

## 2021-12-26 NOTE — ED PROVIDER NOTE - PHYSICAL EXAMINATION
Gen: NAD, non-toxic appearing  Head: normal appearing  HEENT: normal conjunctiva, OP clear  Lung: no respiratory distress, speaking in full sentences, CTA b/l     CV: regular rate and rhythm, no murmurs  Abd: soft, non distended, non tender   MSK: no visible deformities  Neuro: alert and grossly oriented, no gross motor deficits  Skin: No ignacio rashes over the thorax, abdomen or back, no rashes over the sacrum

## 2021-12-26 NOTE — ED PROVIDER NOTE - CLINICAL SUMMARY MEDICAL DECISION MAKING FREE TEXT BOX
46 M w/ MS on Okrvis and Abxz-Puto-Dhbf syndrome presents to the ER w/ fever and cough for 3 days. pt reports that he had contact w/ his mother who had covid had a neg covid test on 12/17. Pt is w/ tmax of 100.9 yesterday. Pt works as a . On exam, pt is not hypoxic ambulatory sat of 98 percent. pt w/ no positive covid test he is triple vaccinated. Plan for labs imaging and reassessment. Suspect likely covid pneumonia however, less likely occult bacteremia, will send cultures if xray imaging and labs not c/w covid pt TBA. pt will be referred for monoclonal wife vaibhav mee ap- 46 M w/ MS on Okrvis and Ppjl-Nknu-Xpgy syndrome presents to the ER w/ fever and cough for 3 days. pt reports that he had contact w/ his mother who had covid had a neg covid test on 12/17. Pt is w/ tmax of 100.9 yesterday. Pt works as a . On exam, pt is not hypoxic ambulatory sat of 98 percent. pt w/ no positive covid test he is triple vaccinated. Plan for labs imaging and reassessment. Suspect likely covid pneumonia however, less likely occult bacteremia, will send cultures if xray imaging and labs not c/w covid pt TBA. pt will be referred for monoclonal wife vaibhav mee

## 2021-12-26 NOTE — ED PROVIDER NOTE - PROGRESS NOTE DETAILS
ap- discussion w/ pt regarding r/b of admission vs dc home and referral for monoclonal pt w/ findings highly suggestive of covid, pt comfortable w/ dc w/ outpt referral, will return if sat worsens or if he develops worsening sx Aric Alexander M.D. (PGY-1): work up = covid +, otherwise unremarkable. ambulated pt in the room for 2 min, SpO2 % on RA throughout this. plan = discharge w/ symptom monitoring, self isolation and referral for monoclonal ab therapy as discussed in last progress note. the plan was communicated to the patient and his wife, both of whom are highly agreeable.

## 2021-12-26 NOTE — ED PROVIDER NOTE - PATIENT PORTAL LINK FT
You can access the FollowMyHealth Patient Portal offered by [facility] by registering at the following website: [email or web address]. By joining [facility]’s FollowMyHealth portal, you will also be able to view your health information using other applications (apps) compatible with our system.

## 2021-12-26 NOTE — ED ADULT NURSE NOTE - NS ED NOTE ABUSE RESPONSE YN
Jesus is a 13 year old who is being evaluated via a billable video visit.      How would you like to obtain your AVS? Mail a copy  IWill anyone else be joining your video visit? Same number     Video Start Time:   Start: 10/20/2021 08:10 am  Stop: 10/20/2021 08:32 am  Assessment & Plan   Diagnoses and all orders for this visit:    Acne vulgaris  -     tretinoin (RETIN-A) 0.025 % external gel; Apply topically At Bedtime  -     benzoyl peroxide (PANOXYL) 10 % external liquid; Apply topically At Bedtime Wash as directed qd  -     Adult Dermatology Referral; Future  -     Adult Dermatology Referral; Future        Prescription drug management  30 minutes spent on the date of the encounter doing chart review, history and exam, documentation and further activities per the note         Follow Up  No follow-ups on file.  If not improving or if worsening  next preventive care visit    Jose A Dong MD        Video-Visit Details    Type of service:  Video Visit     Originating Location (pt. Location): Home    Distant Location (provider location):  [facility]     Platform used for Video Visit: Krystle    Iman Lee is a 13 year old who presents for the following health issues     HPI       SUBJECTIVE:  Jesus Coronel is a 13 year old male who presents with dad via video visit to discuss acne. Had onset of acne approximately several years ago.    ago. But has not cleared.    Jesus sates that when he did take prescribed medications including minocycline , he did improved   Past treatments used: benzoyl peroxide, adapalene cream and oral antibiotics ( ).     OBJECTIVE:  Patient appears well, vital signs normal.   Skin: moderate (pimple) papular acne is noted on the chest and face.   ASSESSMENT:   Acne Vulgaris    PLAN:   Discussion of pathogenesis and various treatment modalities with associated side effects is discussed. Rx for benzoyl peroxide and retin A per  orders, and follow up visit in 2 to 6 months depending on response to treatment.    Yes

## 2021-12-26 NOTE — ED PROVIDER NOTE - NSFOLLOWUPINSTRUCTIONS_ED_ALL_ED_FT
You will be called with regards for your monoclonal antibody therapy. If you do get their call, call your pulmonologist and ask for a new referral.     Return to the ED for any new or worsening symptoms.     Self-isolate per the instructions handed to you with this discharge paperwork. You will be called with regards for your monoclonal antibody therapy. If you do get their call, call your pulmonologist and ask for a new referral.     Return to the ED for any new or worsening symptoms.     Self-isolate per the instructions handed to you with this discharge paperwork. Isolate for 10 days since your symptoms started.

## 2021-12-28 ENCOUNTER — TRANSCRIPTION ENCOUNTER (OUTPATIENT)
Age: 46
End: 2021-12-28

## 2021-12-28 ENCOUNTER — APPOINTMENT (OUTPATIENT)
Dept: INTERNAL MEDICINE | Facility: CLINIC | Age: 46
End: 2021-12-28
Payer: COMMERCIAL

## 2021-12-28 PROCEDURE — 99442: CPT

## 2021-12-28 NOTE — PLAN
[FreeTextEntry1] : As patient was assessed via telephone, no physical examination or vitals done at this time. Diagnosis and plan based on symptoms reported by patient.\par \par 1. Will enroll patient for MAB\par \par 2. To start azithromycin 500mg qd x5 days. Tylenol/ Motrin as needed for fever\par \par 3. Will hold off on PO steroids at this time and trial inhaled steroids to avoid negation of MAB infusion\par \par 4. To notify us of any changes in status\par \par Agrees with plan above. All questions answered. Duration of call 14:54

## 2021-12-28 NOTE — REVIEW OF SYSTEMS
[Fever] : fever [Chills] : chills [Fatigue] : fatigue [Earache] : earache [Wheezing] : wheezing [Cough] : cough [Dyspnea on Exertion] : dyspnea on exertion [Negative] : Heme/Lymph

## 2021-12-28 NOTE — HISTORY OF PRESENT ILLNESS
[Home] : at home, [unfilled] , at the time of the visit. [Medical Office: ([facility])___] : at the medical office located in  [Verbal consent obtained from patient] : the patient, [unfilled] [FreeTextEntry8] : Patient presents for telephonic evaluation s/p COVID + on 12/26/2021. Patient reports he became symptomatic with dry cough, shortness of breath with exertion, wheezing and fever on 12/24/2021. Patient reports today, he noticed he is now expectorating yellow mucus with cough. Patient reports he went to ER 12/26 and was given fluids and then sent home. Reports he is feeling worse today. Currently has fever of 101.3. Girlfriend also symptomatic that lives with him. Patient vaccinated x3 with last dose 9/1/2021- [facility]. Patient denies distress at time of call. Requesting MAB infusion as patient has MS and on Ocrevus, known to ihibit antibody production.\par

## 2021-12-29 ENCOUNTER — APPOINTMENT (OUTPATIENT)
Dept: DISASTER EMERGENCY | Facility: HOSPITAL | Age: 46
End: 2021-12-29

## 2021-12-29 ENCOUNTER — OUTPATIENT (OUTPATIENT)
Dept: INPATIENT UNIT | Facility: HOSPITAL | Age: 46
LOS: 1 days | End: 2021-12-29
Payer: COMMERCIAL

## 2021-12-29 VITALS
SYSTOLIC BLOOD PRESSURE: 107 MMHG | RESPIRATION RATE: 20 BRPM | DIASTOLIC BLOOD PRESSURE: 67 MMHG | HEART RATE: 57 BPM | TEMPERATURE: 98 F | OXYGEN SATURATION: 98 %

## 2021-12-29 VITALS — HEIGHT: 69 IN | HEART RATE: 56 BPM | RESPIRATION RATE: 20 BRPM | TEMPERATURE: 98 F | WEIGHT: 138.89 LBS

## 2021-12-29 DIAGNOSIS — U07.1 COVID-19: ICD-10-CM

## 2021-12-29 PROCEDURE — M0247: CPT

## 2021-12-29 RX ORDER — SOTROVIMAB 62.5 MG/ML
500 INJECTION, SOLUTION, CONCENTRATE INTRAVENOUS ONCE
Refills: 0 | Status: COMPLETED | OUTPATIENT
Start: 2021-12-29 | End: 2021-12-29

## 2021-12-29 RX ORDER — SODIUM CHLORIDE 9 MG/ML
250 INJECTION INTRAMUSCULAR; INTRAVENOUS; SUBCUTANEOUS
Refills: 0 | Status: DISCONTINUED | OUTPATIENT
Start: 2021-12-29 | End: 2022-01-13

## 2021-12-29 RX ADMIN — SODIUM CHLORIDE 25 MILLILITER(S): 9 INJECTION INTRAMUSCULAR; INTRAVENOUS; SUBCUTANEOUS at 17:16

## 2021-12-29 RX ADMIN — SOTROVIMAB 116 MILLIGRAM(S): 62.5 INJECTION, SOLUTION, CONCENTRATE INTRAVENOUS at 17:16

## 2021-12-29 NOTE — CHART NOTE - NSCHARTNOTEFT_GEN_A_CORE
CC: Monoclonal Antibody Infusion/COVID 19 Positive  46yMale with PMHx MS w/lung disease and symptoms of body aches, malaise, cough    exam/findings:  T(C): 36.9 (12-29-21 @ 16:43), Max: 36.9 (12-29-21 @ 16:43)  HR: 56 (12-29-21 @ 16:43) (56 - 56)  BP: --  RR: 20 (12-29-21 @ 16:43) (20 - 20)  SpO2: --      PE:   Appearance: NAD	  HEENT:   Normal oral mucosa,   Lymphatic: No lymphadenopathy  Cardiovascular: Normal S1 S2, No JVD, No murmurs, No edema  Respiratory: regular rate and expansion  Gastrointestinal:  Soft, Non-tender, + BS	  Skin: warm and dry  Neurologic: Non-focal  Extremities: Normal range of motion, no calf tenderness or edema    ASSESSMENT:  Pt is a 46y with PMHx of MS, respiratory illness, Covid 19 Positive with symptoms in the last 10 days, who presents to infusion center for Monoclonal antibody infusion (Sotrovimab)  Symptoms/ Criteria: cough, malaise, headache  Risk Profile includes: Immunosuppresive treatment    PLAN:  - infusion procedure explained to patient   -Consent for monoclonal antibody infusion obtained   - Risk & benifits discussed/all questions answered  -infuse 500mg of sotrovimab over 30min  -observe patient for one hour post infusion      I have reviewed the Sotrovimab Emergency Use Authorization (EAU) and I have provided the patient or patient's caregiver with the following information:  1. FDA has authorized emergency use of Sotrovimab, which is not FDA-approved biologic product.  2. The patient or patient's caregiver has the option to accept or refuse administration of Sotrovimab  3. The significant known and benefits are unknown.  4. Information on available alternative treatments and risks and benefits of those alternatives.    Discharge:  Patient tolerated infusion well denies complaints of chest pain/SOB/dizziness/ palps  Vital signs stable  D/C instructions given/ fact sheet included.  Patient to follow-up with PCP as needed.

## 2021-12-31 LAB
CULTURE RESULTS: SIGNIFICANT CHANGE UP
CULTURE RESULTS: SIGNIFICANT CHANGE UP
SPECIMEN SOURCE: SIGNIFICANT CHANGE UP
SPECIMEN SOURCE: SIGNIFICANT CHANGE UP

## 2022-01-03 ENCOUNTER — OUTPATIENT (OUTPATIENT)
Dept: OUTPATIENT SERVICES | Facility: HOSPITAL | Age: 47
LOS: 1 days | End: 2022-01-03
Payer: COMMERCIAL

## 2022-01-03 ENCOUNTER — APPOINTMENT (OUTPATIENT)
Dept: RADIOLOGY | Facility: CLINIC | Age: 47
End: 2022-01-03
Payer: COMMERCIAL

## 2022-01-03 DIAGNOSIS — U07.1 COVID-19: ICD-10-CM

## 2022-01-03 PROCEDURE — 71046 X-RAY EXAM CHEST 2 VIEWS: CPT | Mod: 26

## 2022-01-03 PROCEDURE — 71046 X-RAY EXAM CHEST 2 VIEWS: CPT

## 2022-01-03 RX ORDER — BUDESONIDE AND FORMOTEROL FUMARATE DIHYDRATE 160; 4.5 UG/1; UG/1
160-4.5 AEROSOL RESPIRATORY (INHALATION) TWICE DAILY
Qty: 1 | Refills: 3 | Status: DISCONTINUED | COMMUNITY
Start: 2021-12-28 | End: 2022-01-03

## 2022-02-17 ENCOUNTER — NON-APPOINTMENT (OUTPATIENT)
Age: 47
End: 2022-02-17

## 2022-02-28 ENCOUNTER — APPOINTMENT (OUTPATIENT)
Dept: INTERNAL MEDICINE | Facility: CLINIC | Age: 47
End: 2022-02-28
Payer: COMMERCIAL

## 2022-02-28 VITALS
HEIGHT: 68 IN | BODY MASS INDEX: 21.37 KG/M2 | SYSTOLIC BLOOD PRESSURE: 118 MMHG | RESPIRATION RATE: 18 BRPM | OXYGEN SATURATION: 98 % | HEART RATE: 70 BPM | TEMPERATURE: 98.9 F | WEIGHT: 141 LBS | DIASTOLIC BLOOD PRESSURE: 70 MMHG

## 2022-02-28 PROCEDURE — 94727 GAS DIL/WSHOT DETER LNG VOL: CPT

## 2022-02-28 PROCEDURE — 94729 DIFFUSING CAPACITY: CPT

## 2022-02-28 PROCEDURE — ZZZZZ: CPT

## 2022-02-28 PROCEDURE — 94010 BREATHING CAPACITY TEST: CPT

## 2022-02-28 PROCEDURE — 99214 OFFICE O/P EST MOD 30 MIN: CPT | Mod: 25

## 2022-02-28 RX ORDER — FLUTICASONE PROPIONATE AND SALMETEROL XINAFOATE 230; 21 UG/1; UG/1
230-21 AEROSOL, METERED RESPIRATORY (INHALATION) TWICE DAILY
Qty: 1 | Refills: 11 | Status: DISCONTINUED | COMMUNITY
Start: 2022-01-06 | End: 2022-02-28

## 2022-02-28 RX ORDER — PREDNISONE 20 MG/1
20 TABLET ORAL
Qty: 21 | Refills: 0 | Status: DISCONTINUED | COMMUNITY
Start: 2022-01-13 | End: 2022-02-28

## 2022-02-28 RX ORDER — BUDESONIDE 180 UG/1
180 AEROSOL, POWDER RESPIRATORY (INHALATION) DAILY
Qty: 1 | Refills: 11 | Status: DISCONTINUED | COMMUNITY
Start: 2022-01-03 | End: 2022-02-28

## 2022-02-28 RX ORDER — AZITHROMYCIN 500 MG/1
500 TABLET, FILM COATED ORAL DAILY
Qty: 5 | Refills: 0 | Status: DISCONTINUED | COMMUNITY
Start: 2021-12-28 | End: 2022-02-28

## 2022-02-28 RX ORDER — PROMETHAZINE HYDROCHLORIDE AND CODEINE PHOSPHATE 6.25; 1 MG/5ML; MG/5ML
6.25-1 SOLUTION ORAL
Qty: 210 | Refills: 0 | Status: DISCONTINUED | COMMUNITY
Start: 2022-01-13 | End: 2022-02-28

## 2022-02-28 NOTE — HISTORY OF PRESENT ILLNESS
[FreeTextEntry1] : The patient comes in today for a routine followup evaluation.\par  [de-identified] : The patient states, that he is doing relatively well at this time the patient did contract the corona virus back in December. Due to his underlying history of multiple sclerosis, and due to the fact that he is on an immune modulating medication, he was treated with monoclonal antibodies. He did improve slightly after the infusion. He notes that his function, as basically back to his baseline. His pulmonary status has been stable.\par \par The patient does complain of some difficulty breathing primarily due to wearing the mask. He has not been exercising recently, due to the cold weather. He has not required the use of Advair. There has been no significant cough or sputum production recently.\par \par The patient states, that since receiving the vaccination for the corona virus, he has complained of fullness in his ears, and he has also developed bilateral tinnitus. His appetite has been good. He denies any acute constitutional symptoms at this time. He continues to be followed carefully by his neurologist for the multiple sclerosis. He has not noted any neurological worsening or progression. He now comes in for this assessment.

## 2022-02-28 NOTE — DATA REVIEWED
[FreeTextEntry1] : A pulmonary function test is performed. A lung volumes are within normal limits except for a slight decrease in residual volume. Lung mechanics within normal limits. Bronchodilator reactivity is not assessed. The DLCO saturation maintained. This represents fairly normal physiologic function. A minimal degree of restriction may be present.

## 2022-02-28 NOTE — PHYSICAL EXAM
[General Appearance - Alert] : alert [General Appearance - In No Acute Distress] : in no acute distress [Sclera] : the sclera and conjunctiva were normal [PERRL With Normal Accommodation] : pupils were equal in size, round, and reactive to light [Extraocular Movements] : extraocular movements were intact [Outer Ear] : the ears and nose were normal in appearance [Oropharynx] : the oropharynx was normal [Neck Appearance] : the appearance of the neck was normal [Neck Cervical Mass (___cm)] : no neck mass was observed [Jugular Venous Distention Increased] : there was no jugular-venous distention [Thyroid Diffuse Enlargement] : the thyroid was not enlarged [Thyroid Nodule] : there were no palpable thyroid nodules [Auscultation Breath Sounds / Voice Sounds] : lungs were clear to auscultation bilaterally [Heart Rate And Rhythm] : heart rate was normal and rhythm regular [Heart Sounds] : normal S1 and S2 [Heart Sounds Gallop] : no gallops [Murmurs] : no murmurs [Heart Sounds Pericardial Friction Rub] : no pericardial rub [Arterial Pulses Carotid] : carotid pulses were normal with no bruits [Edema] : there was no peripheral edema [Bowel Sounds] : normal bowel sounds [Abdomen Soft] : soft [Abdomen Tenderness] : non-tender [] : no hepato-splenomegaly [Abdomen Mass (___ Cm)] : no abdominal mass palpated [FreeTextEntry1] : 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

## 2022-02-28 NOTE — PLAN
[FreeTextEntry1] : 1. Continue with medication as outlined above.\par \par 2. The patient is scheduled to receive his fourth vaccination for the corona virus later today. This was suggested by his neurologist. I have told the patient to contact his neurologist about whether or not he thought he would be a candidate for Evusheld, going forward.\par \par 3. The patient will undergo an ENT evaluation with Dr. Rivera to further evaluate his tinnitus and abnormal sensation of fullness in his ears\par \par 4. The patient will undergo a CAT scan of the abdomen and pelvis to evaluate the kidneys for possible underlying malignancy given his history of the Dgae-Kxfu-Hqet syndrome.\par \par #5. Follow up with myself in 6 months with a wellness evaluation and routine fasting blood work.

## 2022-03-11 ENCOUNTER — RESULT REVIEW (OUTPATIENT)
Age: 47
End: 2022-03-11

## 2022-03-19 ENCOUNTER — OUTPATIENT (OUTPATIENT)
Dept: OUTPATIENT SERVICES | Facility: HOSPITAL | Age: 47
LOS: 1 days | End: 2022-03-19
Payer: COMMERCIAL

## 2022-03-19 ENCOUNTER — APPOINTMENT (OUTPATIENT)
Dept: CT IMAGING | Facility: CLINIC | Age: 47
End: 2022-03-19
Payer: COMMERCIAL

## 2022-03-19 DIAGNOSIS — Q87.89 OTHER SPECIFIED CONGENITAL MALFORMATION SYNDROMES, NOT ELSEWHERE CLASSIFIED: ICD-10-CM

## 2022-03-19 DIAGNOSIS — Z00.8 ENCOUNTER FOR OTHER GENERAL EXAMINATION: ICD-10-CM

## 2022-03-19 PROCEDURE — 74178 CT ABD&PLV WO CNTR FLWD CNTR: CPT | Mod: 26

## 2022-03-19 PROCEDURE — 74178 CT ABD&PLV WO CNTR FLWD CNTR: CPT

## 2022-03-24 ENCOUNTER — NON-APPOINTMENT (OUTPATIENT)
Age: 47
End: 2022-03-24

## 2022-05-26 ENCOUNTER — NON-APPOINTMENT (OUTPATIENT)
Age: 47
End: 2022-05-26

## 2022-08-29 ENCOUNTER — APPOINTMENT (OUTPATIENT)
Dept: INTERNAL MEDICINE | Facility: CLINIC | Age: 47
End: 2022-08-29

## 2022-08-29 VITALS
SYSTOLIC BLOOD PRESSURE: 112 MMHG | BODY MASS INDEX: 21.22 KG/M2 | HEART RATE: 63 BPM | TEMPERATURE: 98.3 F | WEIGHT: 140 LBS | DIASTOLIC BLOOD PRESSURE: 72 MMHG | OXYGEN SATURATION: 97 % | RESPIRATION RATE: 16 BRPM | HEIGHT: 68 IN

## 2022-08-29 PROCEDURE — 99396 PREV VISIT EST AGE 40-64: CPT

## 2022-08-29 NOTE — PLAN
[FreeTextEntry1] : 1.  Continue with medication as outlined above.\par \par 2.  The patient will try Claritin-D as needed for his nasal congestion\par \par 3.  Routine blood work to be performed at this time\par \par 4.  Patient has again been referred to Dr. Thomas in for a baseline screening colonoscopy\par \par 5.  The patient will undergo a repeat CAT scan of the abdomen and pelvis every 3 years for screening for renal cell carcinoma.\par \par 6.  Derm follow-up with Dr. Reed\par \par 7.  Follow-up with myself in 6 months with full pulmonary function testing.

## 2022-09-12 ENCOUNTER — APPOINTMENT (OUTPATIENT)
Dept: INTERNAL MEDICINE | Facility: CLINIC | Age: 47
End: 2022-09-12

## 2022-09-12 DIAGNOSIS — M06.9 RHEUMATOID ARTHRITIS, UNSPECIFIED: ICD-10-CM

## 2022-09-12 PROCEDURE — 99442: CPT

## 2022-09-12 NOTE — PLAN
[FreeTextEntry1] : As patient was assessed via telephone, no physical examination or vitals done at this time. Diagnosis and plan based on symptoms reported by patient.\par \par 1. Will enroll patient for MAB\par \par 3. To notify us of any changes in status\par \par Agrees with plan above. All questions answered. Duration of call 16:54. \par

## 2022-09-12 NOTE — HISTORY OF PRESENT ILLNESS
[Home] : at home, [unfilled] , at the time of the visit. [Medical Office: ([facility])___] : at the medical office located in  [Verbal consent obtained from patient] : the patient, [unfilled] [FreeTextEntry8] : Patient presents for telephonic evaluation s/p COVID + 9/12/22\par Symptoms started 9/9/2022\par Fatigued, metallic taste, no appetite, ringing and pressure in ears \par Not sure if he has fever, but does have chills \par Vaccinated x4- Moderna \par Patient denies distress at time of call. Requesting MAB infusion as patient has MS and on Ocrevus, known to ihibit antibody production.\par Nausea/ vomiting and diarrhea

## 2022-09-13 ENCOUNTER — APPOINTMENT (OUTPATIENT)
Dept: DISASTER EMERGENCY | Facility: HOSPITAL | Age: 47
End: 2022-09-13

## 2022-09-13 ENCOUNTER — OUTPATIENT (OUTPATIENT)
Dept: OUTPATIENT SERVICES | Facility: HOSPITAL | Age: 47
LOS: 1 days | End: 2022-09-13

## 2022-09-13 ENCOUNTER — TRANSCRIPTION ENCOUNTER (OUTPATIENT)
Age: 47
End: 2022-09-13

## 2022-09-13 VITALS
TEMPERATURE: 99 F | SYSTOLIC BLOOD PRESSURE: 135 MMHG | HEART RATE: 71 BPM | DIASTOLIC BLOOD PRESSURE: 78 MMHG | RESPIRATION RATE: 20 BRPM | OXYGEN SATURATION: 99 %

## 2022-09-13 VITALS
SYSTOLIC BLOOD PRESSURE: 118 MMHG | TEMPERATURE: 98 F | RESPIRATION RATE: 18 BRPM | HEART RATE: 71 BPM | DIASTOLIC BLOOD PRESSURE: 75 MMHG | OXYGEN SATURATION: 99 %

## 2022-09-13 DIAGNOSIS — U07.1 COVID-19: ICD-10-CM

## 2022-09-13 RX ORDER — BEBTELOVIMAB 87.5 MG/ML
175 INJECTION, SOLUTION INTRAVENOUS ONCE
Refills: 0 | Status: COMPLETED | OUTPATIENT
Start: 2022-09-13 | End: 2022-09-13

## 2022-09-13 RX ADMIN — BEBTELOVIMAB 175 MILLIGRAM(S): 87.5 INJECTION, SOLUTION INTRAVENOUS at 15:09

## 2022-09-13 NOTE — CHART NOTE - NSCHARTNOTEFT_GEN_A_CORE
CC: Monoclonal Antibody Infusion/COVID 19 Positive  46y Male recent dx of COVID 19+ who presents today for elective Bebtelovimab. Patient has been screened and was deemed to be a candidate.    Symptoms/ Criteria    Date of Symptom Onset: 9/10/22  Symptoms:  Sore throat, nasal congestion, productive cough, nausea, vomiting, headache, fever, chills, fatigue   Date of Positive COVID PCR: 9/12/22  Risk Profile includes:   Multiple sclerosis and Tamara–Evangelina–Dubé syndrome    Vaccination Status: Moderna vaccinated, boosted 2x    PMHx:  Infection due to severe acute respiratory syndrome coronavirus 2 (SARS-CoV-2)        Exam/findings:  T(C): 37.4 (09-13-22 @ 14:41), Max: 37.4 (09-13-22 @ 14:41)  HR: 71 (09-13-22 @ 14:41) (71 - 71)  BP: 135/78 (09-13-22 @ 14:41) (135/78 - 135/78)  RR: 20 (09-13-22 @ 14:41) (20 - 20)  SpO2: 99% (09-13-22 @ 14:41) (99% - 99%)    PE:   Appearance: NAD	  HEENT:  NC/AT  Cardiovascular:  No edema  Respiratory: no use of accessory muscles  Gastrointestinal:  non-distended   Skin: warm and dry  Neurologic: Non-focal  Extremities: Normal range of motion    ASSESSMENT:  Pt is COVID positive with mild to moderate symptoms who was referred for elective MAB (Bebtelovimab).    PLAN:  - MAB treatment explained to patient. I have reviewed the Bebtelovimab Emergency Use Authorization (EUA) and I have provided the patient or patient's caregiver with the following information:   1. FDA has authorized emergency use of Bebtelovimab to be administered for the treatment of mild to moderate COVID-19, which is not an FDA-approved biological product.   2. The patient or patient's caregiver has the option to accept or refuse administration of MAB.   3. The significant known and potential risks and benefits of Bebtelovimab and the extent to which such risks and benefits are unknown.  4. Information on available alternative treatments and risks and benefits of those alternatives.  - Patient verbalized understanding of plan and agrees to treatment. All questions answered.  - Consent for MAB obtained.   - 175mg of Bebtelovimab administered as a single intravenous injection over at least 30 seconds.   - Observe patient for one hour post medication administration and then if stable, discharge home with oupatient follow up as planned by PCP.      POST ASSESSMENT:   Patient completed MAB, and monitored x 1 hour post-infusion with no adverse reactions noted, remained hemodynamically stable.  - Patient tolerated infusion well; denies complaints of chest pain/SOB/dizziness/palpitations.   - VSS for discharge home.  - D/C instructions given/ fact sheet included.  - Patient was instructed to self-isolate and use infection control measures (e.g wear mask, isolate, social distance, avoid sharing personal items, clean and disinfect "high touch" surfaces, and frequent handwashing according to the CDC guidelines.   - The patient was informed on what symptoms to be aware of for the next couple of days, and if there are any issues to call the 24/7 clinical call center. Patient was instructed to follow up with primary care provider as needed.    DISCHARGE after 1 hour infusion

## 2022-09-14 ENCOUNTER — TRANSCRIPTION ENCOUNTER (OUTPATIENT)
Age: 47
End: 2022-09-14

## 2022-09-16 ENCOUNTER — NON-APPOINTMENT (OUTPATIENT)
Age: 47
End: 2022-09-16

## 2022-09-20 ENCOUNTER — NON-APPOINTMENT (OUTPATIENT)
Age: 47
End: 2022-09-20

## 2022-09-23 ENCOUNTER — NON-APPOINTMENT (OUTPATIENT)
Age: 47
End: 2022-09-23

## 2022-09-28 ENCOUNTER — APPOINTMENT (OUTPATIENT)
Dept: PULMONOLOGY | Facility: CLINIC | Age: 47
End: 2022-09-28

## 2022-09-28 VITALS
HEIGHT: 68 IN | BODY MASS INDEX: 21.07 KG/M2 | HEART RATE: 67 BPM | WEIGHT: 139 LBS | DIASTOLIC BLOOD PRESSURE: 74 MMHG | TEMPERATURE: 97.9 F | SYSTOLIC BLOOD PRESSURE: 110 MMHG | OXYGEN SATURATION: 97 %

## 2022-09-28 DIAGNOSIS — J01.10 ACUTE FRONTAL SINUSITIS, UNSPECIFIED: ICD-10-CM

## 2022-09-28 PROCEDURE — 99213 OFFICE O/P EST LOW 20 MIN: CPT

## 2022-09-28 NOTE — HISTORY OF PRESENT ILLNESS
[FreeTextEntry8] : this is a 47yo patient of Dr. Guidry s/p COVID + 9/12/22, symptoms started 9/9/2022\par at the time- Fatigued, metallic taste, no appetite, ringing and pressure in ears, chills.\par \par Vaccinated x4- Moderna \par \par he was managed with MAB infusion given hx MS and on Ocrevus.  \par \par 09/20/22 patient followed up with Tita KURTZ for chest tightness/ Coughing when speaking, s/p MAB infusion. \par \par managed on prednisone taper, Promethazine codeine prn cough\par \par reports having been compliant with pred taper but was unable to start cough medicine- was not available. \par \par patient spoke with NP Tita 09/27 regarding continued coughing fits- he was unable to finish sentence without coughing, was recommended for appt to be seen in office. patient presents for evaluation of the above concerns. \par \par He was sent for STAT CXR 09/27/22 which was unremarkable\par He was Started on doxycycline 100 mg BID x10 days, which he reports taking as prescribed\par and sent new Rx for Promethazine- codeine however patient was still unable to fill Rx as [facility]. \par \par pulm hx significant for Pcuy-Psfe-Whxa syndrome/ Chronic obstructive lung disease\par \par he is in no acute distress at this time. will evaluate and manage as appropriate.

## 2022-09-28 NOTE — COUNSELING
[Fall prevention counseling provided] : Fall prevention counseling provided [Behavioral health counseling provided] : Behavioral health counseling provided [Sleep ___ hours/day] : Sleep [unfilled] hours/day [Engage in a relaxing activity] : Engage in a relaxing activity [Plan in advance] : Plan in advance [None] : None [Good understanding] : Patient has a good understanding of lifestyle changes and steps needed to achieve self management goal [de-identified] : URI;\par take medications as prescribed/directed\par use of OTC probiotic recommended.\par warm salt water gargles as needed for symptoms relief.\par get plenty of rest and fluids.\par infection prevention education as discussed above; view counseling section.\par notify/return to office if worsening/persistent symptoms or new onset complaints/concerns, in the event of any emergency or life threatening condition, go to the nearest emergency department and then notify office. \par patient verbalized understanding and agrees with plan of care.

## 2022-09-28 NOTE — REVIEW OF SYSTEMS
[Fatigue] : fatigue [Postnasal Drip] : postnasal drip [Nasal Discharge] : nasal discharge [Cough] : cough [Negative] : Heme/Lymph [Fever] : no fever [Chills] : no chills [Hot Flashes] : no hot flashes [Night Sweats] : no night sweats [Recent Change In Weight] : ~T no recent weight change [Earache] : no earache [Hearing Loss] : no hearing loss [Nosebleeds] : no nosebleeds [Sore Throat] : no sore throat [Hoarseness] : no hoarseness [Shortness Of Breath] : no shortness of breath [Wheezing] : no wheezing [Dyspnea on Exertion] : not dyspnea on exertion [Skin Rash] : no skin rash [FreeTextEntry4] : sinus congestion/ pressure [FreeTextEntry5] : "

## 2022-09-28 NOTE — PHYSICAL EXAM
[No Acute Distress] : no acute distress [Normal Sclera/Conjunctiva] : normal sclera/conjunctiva [Normal Outer Ear/Nose] : the outer ears and nose were normal in appearance [Normal TMs] : both tympanic membranes were normal [No Respiratory Distress] : no respiratory distress  [No Accessory Muscle Use] : no accessory muscle use [Clear to Auscultation] : lungs were clear to auscultation bilaterally [Normal] : normal rate, regular rhythm, normal S1 and S2 and no murmur heard [No Edema] : there was no peripheral edema [Normal Posterior Cervical Nodes] : no posterior cervical lymphadenopathy [Normal Anterior Cervical Nodes] : no anterior cervical lymphadenopathy [Coordination Grossly Intact] : coordination grossly intact [Normal Gait] : normal gait [Normal Affect] : the affect was normal [Normal Insight/Judgement] : insight and judgment were intact [de-identified] : a [de-identified] : allergic shiners [de-identified] : large post nasal drip, sinus fullness on palpation/ percussion, facial discomfort on forward bend.

## 2022-10-10 RX ORDER — PROMETHAZINE HYDROCHLORIDE AND CODEINE PHOSPHATE 6.25; 1 MG/5ML; MG/5ML
6.25-1 SOLUTION ORAL
Qty: 300 | Refills: 0 | Status: DISCONTINUED | COMMUNITY
Start: 2022-09-20 | End: 2022-10-10

## 2022-10-10 RX ORDER — DOXYCYCLINE 100 MG/1
100 TABLET, FILM COATED ORAL
Qty: 20 | Refills: 0 | Status: DISCONTINUED | COMMUNITY
Start: 2022-09-27 | End: 2022-10-10

## 2022-10-10 RX ORDER — PROMETHAZINE HYDROCHLORIDE AND CODEINE PHOSPHATE 6.25; 1 MG/5ML; MG/5ML
6.25-1 SOLUTION ORAL
Qty: 120 | Refills: 0 | Status: DISCONTINUED | COMMUNITY
Start: 2022-09-27 | End: 2022-10-10

## 2022-10-10 RX ORDER — PREDNISONE 20 MG/1
20 TABLET ORAL
Qty: 21 | Refills: 0 | Status: DISCONTINUED | COMMUNITY
Start: 2022-09-20 | End: 2022-10-10

## 2022-10-11 ENCOUNTER — NON-APPOINTMENT (OUTPATIENT)
Age: 47
End: 2022-10-11

## 2023-04-05 ENCOUNTER — APPOINTMENT (OUTPATIENT)
Dept: INTERNAL MEDICINE | Facility: CLINIC | Age: 48
End: 2023-04-05
Payer: COMMERCIAL

## 2023-04-05 VITALS
RESPIRATION RATE: 16 BRPM | BODY MASS INDEX: 21.22 KG/M2 | HEIGHT: 68 IN | DIASTOLIC BLOOD PRESSURE: 70 MMHG | WEIGHT: 140 LBS | TEMPERATURE: 98.6 F | HEART RATE: 66 BPM | SYSTOLIC BLOOD PRESSURE: 110 MMHG | OXYGEN SATURATION: 96 %

## 2023-04-05 DIAGNOSIS — D56.3 THALASSEMIA MINOR: ICD-10-CM

## 2023-04-05 DIAGNOSIS — J98.4 OTHER DISORDERS OF LUNG: ICD-10-CM

## 2023-04-05 DIAGNOSIS — H93.19 TINNITUS, UNSPECIFIED EAR: ICD-10-CM

## 2023-04-05 DIAGNOSIS — U07.1 COVID-19: ICD-10-CM

## 2023-04-05 DIAGNOSIS — Z00.00 ENCOUNTER FOR GENERAL ADULT MEDICAL EXAMINATION W/OUT ABNORMAL FINDINGS: ICD-10-CM

## 2023-04-05 DIAGNOSIS — G44.009 CLUSTER HEADACHE SYNDROME, UNSPECIFIED, NOT INTRACTABLE: ICD-10-CM

## 2023-04-05 PROCEDURE — 99214 OFFICE O/P EST MOD 30 MIN: CPT | Mod: 25

## 2023-04-05 PROCEDURE — 94729 DIFFUSING CAPACITY: CPT

## 2023-04-05 PROCEDURE — 94727 GAS DIL/WSHOT DETER LNG VOL: CPT

## 2023-04-05 PROCEDURE — 94060 EVALUATION OF WHEEZING: CPT

## 2023-04-05 PROCEDURE — ZZZZZ: CPT

## 2023-04-05 NOTE — PLAN
[FreeTextEntry1] : 1. Continue current medications as previously outlined. \par \par 2. Prescribed Nurtec ODT for treatment of cluster headaches\par \par 3. Follow-up in 6 months for wellness evaluation\par \par 4.  The patient undergo yearly routine blood work at this time.\par \par 5.  Patient has been referred to the [facility], for assistance with assessing some of his post COVID residual symptomatology.\par \par 6.  Resumption of cardiovascular exercise regimen has been stressed.

## 2023-04-05 NOTE — HISTORY OF PRESENT ILLNESS
[FreeTextEntry1] : Patient presents for an routine follow-up visit.  [de-identified] : The patient feels relatively well at this time. He has a history of multiple sclerosis and is maintained on ocrevus infusions which he gets 2 times per year.  He is doing relatively well.  He was recently seen by his neurologist.  There has been no progression with regards to any obvious symptomatology.\par \par He had COVID on 2 separate occasions.  His last episode was in September 2022, and is still suffering from arthralgia and tinnitus. He was treated with monoclonal antibodies twice. His tinnitus started after his first vaccination for COVID. It worsened after each of the subsequent vaccinations. It appeared to intensify more so, after he contracted COVID in December 2021. He was seen by an ENT physician, who did not provide much help.\par \par He has a history of cluster headaches.  He discussed treatment options with his neurologist.  He did try Imitrex, with only slight improvement in his symptomatology.  He was given a trial of Nurtec, which appeared to work better than the triptan medication.\par \par From a pulmonary standpoint he is doing well. He notes that he has not been able to ride his bike recently so he is not currently exercising. He denies any cough, wheeze, or SOB.\par \par The patient denies any other constitutional symptoms at this time. He now comes in for this assessment.

## 2023-04-05 NOTE — ADDENDUM
[FreeTextEntry1] : I, Earl Sharif, documented this note as a scribe on behalf of Dr. Lance Guidry MD on 04/05/2023.

## 2023-04-05 NOTE — DATA REVIEWED
[FreeTextEntry1] : A pulmonary function test is performed.  Lung volumes reveal a mild decrease in the total lung capacity and a moderate decrease in the residual volume.  The FRC and vital capacity are normal.  Lung mechanics reveal normal flow rates.  Slight bronchodilator reactivity is demonstrated.  The DLCO and saturation are maintained.  This represents a mild degree of restriction.

## 2023-06-09 ENCOUNTER — NON-APPOINTMENT (OUTPATIENT)
Age: 48
End: 2023-06-09

## 2023-06-15 ENCOUNTER — NON-APPOINTMENT (OUTPATIENT)
Age: 48
End: 2023-06-15

## 2023-06-15 ENCOUNTER — APPOINTMENT (OUTPATIENT)
Dept: INTERNAL MEDICINE | Facility: CLINIC | Age: 48
End: 2023-06-15
Payer: COMMERCIAL

## 2023-06-15 VITALS
OXYGEN SATURATION: 95 % | RESPIRATION RATE: 16 BRPM | SYSTOLIC BLOOD PRESSURE: 110 MMHG | TEMPERATURE: 98.4 F | BODY MASS INDEX: 21.22 KG/M2 | HEART RATE: 64 BPM | HEIGHT: 68 IN | WEIGHT: 140 LBS | DIASTOLIC BLOOD PRESSURE: 80 MMHG

## 2023-06-15 PROCEDURE — 99214 OFFICE O/P EST MOD 30 MIN: CPT

## 2023-06-15 NOTE — HISTORY OF PRESENT ILLNESS
[FreeTextEntry8] : Patient is a 47-year-old male with PMH of xagm-ealf-vong syndrome, MS, RA, who presents to office for evaluation of cough x1 month. The patient was seen at  5 days ago and given z pack. Patient has been taking OTC Tylenol sinus relief and Mucinex with little improvement. Still has yellow/green mucus. Cough worse with talking. No fever, distress

## 2023-06-15 NOTE — PLAN
[FreeTextEntry1] : 1. The patient will start 40-20-10 prednisone taper as written. \par \par 2. Doxycycline 100mg BID x 7 days\par \par 3. Promethazine dm / Tessalon perles as needed for cough\par \par 4. If no improvement in symptoms in 3 days patient will obtain CXR\par \par 5. Patient will f/u in office 10/2023 or sooner if needed. Patient will obtain fasting BW before appt

## 2023-06-19 ENCOUNTER — APPOINTMENT (OUTPATIENT)
Dept: RADIOLOGY | Facility: CLINIC | Age: 48
End: 2023-06-19

## 2023-06-19 ENCOUNTER — OUTPATIENT (OUTPATIENT)
Dept: OUTPATIENT SERVICES | Facility: HOSPITAL | Age: 48
LOS: 1 days | End: 2023-06-19
Payer: COMMERCIAL

## 2023-06-19 DIAGNOSIS — J45.909 UNSPECIFIED ASTHMA, UNCOMPLICATED: ICD-10-CM

## 2023-06-19 PROCEDURE — 71046 X-RAY EXAM CHEST 2 VIEWS: CPT

## 2023-06-19 PROCEDURE — 71046 X-RAY EXAM CHEST 2 VIEWS: CPT | Mod: 26

## 2023-06-20 ENCOUNTER — NON-APPOINTMENT (OUTPATIENT)
Age: 48
End: 2023-06-20

## 2023-06-23 ENCOUNTER — APPOINTMENT (OUTPATIENT)
Dept: INTERNAL MEDICINE | Facility: CLINIC | Age: 48
End: 2023-06-23
Payer: COMMERCIAL

## 2023-06-23 ENCOUNTER — NON-APPOINTMENT (OUTPATIENT)
Age: 48
End: 2023-06-23

## 2023-06-23 VITALS
HEIGHT: 68 IN | OXYGEN SATURATION: 98 % | RESPIRATION RATE: 16 BRPM | TEMPERATURE: 97.9 F | SYSTOLIC BLOOD PRESSURE: 110 MMHG | DIASTOLIC BLOOD PRESSURE: 70 MMHG | BODY MASS INDEX: 21.22 KG/M2 | WEIGHT: 140 LBS | HEART RATE: 66 BPM

## 2023-06-23 DIAGNOSIS — J45.909 UNSPECIFIED ASTHMA, UNCOMPLICATED: ICD-10-CM

## 2023-06-23 PROCEDURE — 94060 EVALUATION OF WHEEZING: CPT

## 2023-06-23 PROCEDURE — 96372 THER/PROPH/DIAG INJ SC/IM: CPT | Mod: 59

## 2023-06-23 PROCEDURE — 99215 OFFICE O/P EST HI 40 MIN: CPT | Mod: 25

## 2023-06-23 RX ORDER — TRIAMCINOLONE ACETONIDE 40 MG/ML
40 SUSPENSION INTRA-ARTERIAL; INTRAMUSCULAR
Qty: 6 | Refills: 0 | Status: COMPLETED | OUTPATIENT
Start: 2023-06-23

## 2023-06-23 RX ADMIN — TRIAMCINOLONE ACETONIDE 1.5 MG/ML: 40 INJECTION, SUSPENSION INTRA-ARTICULAR; INTRAMUSCULAR at 00:00

## 2023-06-23 NOTE — HISTORY OF PRESENT ILLNESS
[FreeTextEntry1] : F/U [de-identified] : Patient is a 47-year-old male with PMH of wfhp-gohz-qfyq syndrome, MS, RA, who presents to office for evaluation of cough x1 month. The patient was seen 1 week ago for cough. Treated with prednisone taper and doxycycline. Sent for CXR, negative. Still has cough, unable to speak. Yellow/green mucus has cleared up.

## 2023-06-23 NOTE — PLAN
[FreeTextEntry1] : 1. 60mg of kenalog admin IM\par \par 2. Resume 60-40-20-10 prednisone taper as written\par \par 3. CT chest ordered to further evaluate persistent cough\par \par 4. Patient will start arnuity qd \par \par 5.  F/U with EV 10/2023 or sooner if needed \par \par 6. Notify if no improvement

## 2023-07-12 ENCOUNTER — APPOINTMENT (OUTPATIENT)
Dept: CT IMAGING | Facility: CLINIC | Age: 48
End: 2023-07-12
Payer: COMMERCIAL

## 2023-07-12 ENCOUNTER — OUTPATIENT (OUTPATIENT)
Dept: OUTPATIENT SERVICES | Facility: HOSPITAL | Age: 48
LOS: 1 days | End: 2023-07-12
Payer: COMMERCIAL

## 2023-07-12 DIAGNOSIS — Z00.8 ENCOUNTER FOR OTHER GENERAL EXAMINATION: ICD-10-CM

## 2023-07-12 DIAGNOSIS — J45.909 UNSPECIFIED ASTHMA, UNCOMPLICATED: ICD-10-CM

## 2023-07-12 DIAGNOSIS — R05.9 COUGH, UNSPECIFIED: ICD-10-CM

## 2023-07-12 PROCEDURE — 71250 CT THORAX DX C-: CPT

## 2023-07-12 PROCEDURE — 71250 CT THORAX DX C-: CPT | Mod: 26

## 2023-10-19 ENCOUNTER — APPOINTMENT (OUTPATIENT)
Dept: INTERNAL MEDICINE | Facility: CLINIC | Age: 48
End: 2023-10-19
Payer: COMMERCIAL

## 2023-10-19 VITALS
RESPIRATION RATE: 16 BRPM | HEART RATE: 62 BPM | HEIGHT: 68 IN | BODY MASS INDEX: 21.37 KG/M2 | WEIGHT: 141 LBS | SYSTOLIC BLOOD PRESSURE: 100 MMHG | TEMPERATURE: 98 F | DIASTOLIC BLOOD PRESSURE: 60 MMHG | OXYGEN SATURATION: 98 %

## 2023-10-19 DIAGNOSIS — H66.90 OTITIS MEDIA, UNSPECIFIED, UNSPECIFIED EAR: ICD-10-CM

## 2023-10-19 DIAGNOSIS — R11.0 NAUSEA: ICD-10-CM

## 2023-10-19 PROCEDURE — 99214 OFFICE O/P EST MOD 30 MIN: CPT

## 2023-10-19 RX ORDER — PROMETHAZINE HYDROCHLORIDE AND DEXTROMETHORPHAN HYDROBROMIDE ORAL SOLUTION 15; 6.25 MG/5ML; MG/5ML
6.25-15 SOLUTION ORAL
Qty: 300 | Refills: 1 | Status: DISCONTINUED | COMMUNITY
Start: 2023-06-15 | End: 2023-10-19

## 2023-10-19 RX ORDER — DOXYCYCLINE 100 MG/1
100 CAPSULE ORAL
Qty: 14 | Refills: 0 | Status: DISCONTINUED | COMMUNITY
Start: 2023-06-15 | End: 2023-10-19

## 2023-10-19 RX ORDER — PREDNISONE 20 MG/1
20 TABLET ORAL
Qty: 21 | Refills: 0 | Status: DISCONTINUED | COMMUNITY
Start: 2023-06-23 | End: 2023-10-19

## 2023-10-19 RX ORDER — PREDNISONE 20 MG/1
20 TABLET ORAL
Qty: 11 | Refills: 0 | Status: DISCONTINUED | COMMUNITY
Start: 2023-06-15 | End: 2023-10-19

## 2023-10-19 RX ORDER — FLUTICASONE FUROATE 200 UG/1
200 POWDER RESPIRATORY (INHALATION)
Qty: 1 | Refills: 11 | Status: DISCONTINUED | COMMUNITY
Start: 2023-06-23 | End: 2023-10-19

## 2023-10-19 RX ORDER — ZOLMITRIPTAN 2.5 MG/1
2.5 TABLET, FILM COATED ORAL
Qty: 9 | Refills: 3 | Status: DISCONTINUED | COMMUNITY
Start: 2020-10-29 | End: 2023-10-19

## 2023-10-24 ENCOUNTER — LABORATORY RESULT (OUTPATIENT)
Age: 48
End: 2023-10-24

## 2023-10-24 ENCOUNTER — APPOINTMENT (OUTPATIENT)
Dept: INTERNAL MEDICINE | Facility: CLINIC | Age: 48
End: 2023-10-24
Payer: COMMERCIAL

## 2023-10-24 VITALS
BODY MASS INDEX: 21.74 KG/M2 | RESPIRATION RATE: 16 BRPM | SYSTOLIC BLOOD PRESSURE: 110 MMHG | OXYGEN SATURATION: 97 % | DIASTOLIC BLOOD PRESSURE: 70 MMHG | HEART RATE: 57 BPM | WEIGHT: 143 LBS | TEMPERATURE: 98.2 F

## 2023-10-24 DIAGNOSIS — Z00.00 ENCOUNTER FOR GENERAL ADULT MEDICAL EXAMINATION W/OUT ABNORMAL FINDINGS: ICD-10-CM

## 2023-10-24 PROCEDURE — 99213 OFFICE O/P EST LOW 20 MIN: CPT | Mod: 25

## 2023-10-24 PROCEDURE — 99396 PREV VISIT EST AGE 40-64: CPT | Mod: 25

## 2023-10-26 ENCOUNTER — NON-APPOINTMENT (OUTPATIENT)
Age: 48
End: 2023-10-26

## 2023-10-27 DIAGNOSIS — D64.9 ANEMIA, UNSPECIFIED: ICD-10-CM

## 2023-11-02 ENCOUNTER — NON-APPOINTMENT (OUTPATIENT)
Age: 48
End: 2023-11-02

## 2023-11-28 ENCOUNTER — RESULT CHARGE (OUTPATIENT)
Age: 48
End: 2023-11-28

## 2023-11-29 ENCOUNTER — NON-APPOINTMENT (OUTPATIENT)
Age: 48
End: 2023-11-29

## 2023-11-29 ENCOUNTER — APPOINTMENT (OUTPATIENT)
Dept: RADIOLOGY | Facility: CLINIC | Age: 48
End: 2023-11-29
Payer: COMMERCIAL

## 2023-11-29 ENCOUNTER — OUTPATIENT (OUTPATIENT)
Dept: OUTPATIENT SERVICES | Facility: HOSPITAL | Age: 48
LOS: 1 days | End: 2023-11-29
Payer: COMMERCIAL

## 2023-11-29 ENCOUNTER — APPOINTMENT (OUTPATIENT)
Dept: INTERNAL MEDICINE | Facility: CLINIC | Age: 48
End: 2023-11-29
Payer: COMMERCIAL

## 2023-11-29 VITALS
OXYGEN SATURATION: 97 % | HEART RATE: 66 BPM | BODY MASS INDEX: 22.28 KG/M2 | HEIGHT: 68 IN | RESPIRATION RATE: 16 BRPM | WEIGHT: 147 LBS | SYSTOLIC BLOOD PRESSURE: 124 MMHG | DIASTOLIC BLOOD PRESSURE: 64 MMHG | TEMPERATURE: 99.1 F

## 2023-11-29 DIAGNOSIS — R06.2 WHEEZING: ICD-10-CM

## 2023-11-29 DIAGNOSIS — R05.9 COUGH, UNSPECIFIED: ICD-10-CM

## 2023-11-29 DIAGNOSIS — J20.9 ACUTE BRONCHITIS, UNSPECIFIED: ICD-10-CM

## 2023-11-29 DIAGNOSIS — R09.81 NASAL CONGESTION: ICD-10-CM

## 2023-11-29 DIAGNOSIS — J06.9 ACUTE UPPER RESPIRATORY INFECTION, UNSPECIFIED: ICD-10-CM

## 2023-11-29 PROCEDURE — 71046 X-RAY EXAM CHEST 2 VIEWS: CPT

## 2023-11-29 PROCEDURE — 99214 OFFICE O/P EST MOD 30 MIN: CPT | Mod: 25

## 2023-11-29 PROCEDURE — 94060 EVALUATION OF WHEEZING: CPT

## 2023-11-29 PROCEDURE — 71046 X-RAY EXAM CHEST 2 VIEWS: CPT | Mod: 26

## 2023-11-30 LAB
FERRITIN SERPL-MCNC: 241 NG/ML
IRON SATN MFR SERPL: 18 %
IRON SERPL-MCNC: 45 UG/DL
TIBC SERPL-MCNC: 245 UG/DL
TRANSFERRIN SERPL-MCNC: 174 MG/DL
UIBC SERPL-MCNC: 200 UG/DL

## 2023-12-01 LAB
RAPID RVP RESULT: NOT DETECTED
SARS-COV-2 RNA PNL RESP NAA+PROBE: NOT DETECTED

## 2023-12-03 ENCOUNTER — NON-APPOINTMENT (OUTPATIENT)
Age: 48
End: 2023-12-03

## 2023-12-19 LAB
CK SERPL-CCNC: 28 U/L
ERYTHROCYTE [SEDIMENTATION RATE] IN BLOOD BY WESTERGREN METHOD: 4 MM/HR

## 2023-12-21 ENCOUNTER — RX RENEWAL (OUTPATIENT)
Age: 48
End: 2023-12-21

## 2023-12-23 ENCOUNTER — EMERGENCY (EMERGENCY)
Facility: HOSPITAL | Age: 48
LOS: 1 days | Discharge: HOME | End: 2023-12-23
Attending: EMERGENCY MEDICINE | Admitting: STUDENT IN AN ORGANIZED HEALTH CARE EDUCATION/TRAINING PROGRAM
Payer: COMMERCIAL

## 2023-12-23 VITALS
TEMPERATURE: 97 F | DIASTOLIC BLOOD PRESSURE: 70 MMHG | HEART RATE: 74 BPM | WEIGHT: 139.99 LBS | OXYGEN SATURATION: 99 % | SYSTOLIC BLOOD PRESSURE: 133 MMHG | HEIGHT: 68 IN | RESPIRATION RATE: 18 BRPM

## 2023-12-23 PROCEDURE — 99283 EMERGENCY DEPT VISIT LOW MDM: CPT

## 2023-12-23 PROCEDURE — 99284 EMERGENCY DEPT VISIT MOD MDM: CPT

## 2023-12-23 RX ORDER — IBUPROFEN 200 MG
1 TABLET ORAL
Qty: 9 | Refills: 0
Start: 2023-12-23 | End: 2023-12-25

## 2023-12-23 RX ORDER — ERYTHROMYCIN BASE 5 MG/GRAM
1 OINTMENT (GRAM) OPHTHALMIC (EYE)
Qty: 1 | Refills: 0
Start: 2023-12-23 | End: 2023-12-29

## 2023-12-23 NOTE — ED PROVIDER NOTE - PATIENT PORTAL LINK FT
You can access the FollowMyHealth Patient Portal offered by [facility] by registering at the following website: [email or web address]. By joining [employer]’s FollowMyHealth portal, you will also be able to view your health information using other applications (apps) compatible with our system. You can access the FollowMyHealth Patient Portal offered by [facility] by registering at the following website: [email or web address]. By joining [employer]s FollowMyHealth portal, you will also be able to view your health information using other applications (apps) compatible with our system.

## 2023-12-23 NOTE — ED PROVIDER NOTE - OBJECTIVE STATEMENT
48 year old male with left eye pain and foreign body sensation since yesterday. 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 Denies fever, NVD, dysuria, discharge, reprots mild blurry vision. UTD tetanus.

## 2023-12-23 NOTE — ED PROVIDER NOTE - NSFOLLOWUPINSTRUCTIONS_ED_ALL_ED_FT
please follow up with opthalmology, we will give you a Call     please return to the nearest ED immediately with any new/worsening symptoms

## 2023-12-23 NOTE — ED PROVIDER NOTE - CLINICAL SUMMARY MEDICAL DECISION MAKING FREE TEXT BOX
48 year old male with left corneal foreign body with abrasion, foreign body removed, rx for erythromycin, will set up optho fu, tetanus UTD, will dc

## 2023-12-23 NOTE — ED ADULT TRIAGE NOTE - CHIEF COMPLAINT QUOTE
Patient complaint of pain to the L eye since yesterday. Patient is concerned about metal in the L eye. Patient complaint of drainage and irration to the L eye.

## 2023-12-28 ENCOUNTER — APPOINTMENT (OUTPATIENT)
Dept: OPHTHALMOLOGY | Facility: CLINIC | Age: 48
End: 2023-12-28
Payer: COMMERCIAL

## 2023-12-28 ENCOUNTER — NON-APPOINTMENT (OUTPATIENT)
Age: 48
End: 2023-12-28

## 2023-12-28 PROCEDURE — 92004 COMPRE OPH EXAM NEW PT 1/>: CPT | Mod: 25

## 2023-12-28 PROCEDURE — 65222 REMOVE FOREIGN BODY FROM EYE: CPT

## 2024-01-03 ENCOUNTER — NON-APPOINTMENT (OUTPATIENT)
Age: 49
End: 2024-01-03

## 2024-01-03 ENCOUNTER — APPOINTMENT (OUTPATIENT)
Dept: OPHTHALMOLOGY | Facility: CLINIC | Age: 49
End: 2024-01-03
Payer: COMMERCIAL

## 2024-01-03 PROCEDURE — 92012 INTRM OPH EXAM EST PATIENT: CPT

## 2024-01-07 ENCOUNTER — NON-APPOINTMENT (OUTPATIENT)
Age: 49
End: 2024-01-07

## 2024-01-08 ENCOUNTER — APPOINTMENT (OUTPATIENT)
Dept: OTOLARYNGOLOGY | Facility: CLINIC | Age: 49
End: 2024-01-08
Payer: COMMERCIAL

## 2024-01-08 VITALS
HEART RATE: 60 BPM | DIASTOLIC BLOOD PRESSURE: 64 MMHG | WEIGHT: 139 LBS | BODY MASS INDEX: 20.83 KG/M2 | OXYGEN SATURATION: 97 % | SYSTOLIC BLOOD PRESSURE: 121 MMHG | HEIGHT: 68.5 IN

## 2024-01-08 PROCEDURE — 31231 NASAL ENDOSCOPY DX: CPT

## 2024-01-08 PROCEDURE — 99204 OFFICE O/P NEW MOD 45 MIN: CPT | Mod: 25

## 2024-01-08 RX ORDER — ALBUTEROL SULFATE 90 UG/1
108 (90 BASE) INHALANT RESPIRATORY (INHALATION)
Qty: 1 | Refills: 0 | Status: COMPLETED | COMMUNITY
Start: 2023-11-29 | End: 2024-01-08

## 2024-01-08 RX ORDER — BENZONATATE 200 MG/1
200 CAPSULE ORAL 3 TIMES DAILY
Qty: 30 | Refills: 5 | Status: COMPLETED | COMMUNITY
Start: 2023-06-15 | End: 2024-01-08

## 2024-01-08 RX ORDER — FLUTICASONE PROPIONATE 50 UG/1
50 SPRAY, METERED NASAL
Qty: 1 | Refills: 1 | Status: COMPLETED | COMMUNITY
Start: 2023-11-29 | End: 2024-01-08

## 2024-01-08 RX ORDER — DOXYCYCLINE HYCLATE 100 MG/1
100 TABLET ORAL TWICE DAILY
Qty: 14 | Refills: 0 | Status: COMPLETED | COMMUNITY
Start: 2023-11-29 | End: 2024-01-08

## 2024-01-08 RX ORDER — OCRELIZUMAB 300 MG/10ML
300 INJECTION INTRAVENOUS
Refills: 0 | Status: ACTIVE | COMMUNITY

## 2024-01-08 RX ORDER — PREDNISONE 20 MG/1
20 TABLET ORAL TWICE DAILY
Qty: 14 | Refills: 0 | Status: COMPLETED | COMMUNITY
Start: 2023-10-19 | End: 2024-01-08

## 2024-01-08 RX ORDER — AMOXICILLIN AND CLAVULANATE POTASSIUM 875; 125 MG/1; MG/1
875-125 TABLET, COATED ORAL
Qty: 20 | Refills: 0 | Status: COMPLETED | COMMUNITY
Start: 2023-10-19 | End: 2024-01-08

## 2024-01-08 RX ORDER — ONDANSETRON 4 MG/1
4 TABLET, ORALLY DISINTEGRATING ORAL
Qty: 15 | Refills: 3 | Status: COMPLETED | COMMUNITY
Start: 2023-10-19 | End: 2024-01-08

## 2024-01-10 ENCOUNTER — NON-APPOINTMENT (OUTPATIENT)
Age: 49
End: 2024-01-10

## 2024-01-11 ENCOUNTER — APPOINTMENT (OUTPATIENT)
Dept: RHEUMATOLOGY | Facility: CLINIC | Age: 49
End: 2024-01-11
Payer: COMMERCIAL

## 2024-01-11 ENCOUNTER — APPOINTMENT (OUTPATIENT)
Dept: RHEUMATOLOGY | Facility: CLINIC | Age: 49
End: 2024-01-11

## 2024-01-11 VITALS
SYSTOLIC BLOOD PRESSURE: 120 MMHG | HEART RATE: 66 BPM | WEIGHT: 145 LBS | OXYGEN SATURATION: 98 % | TEMPERATURE: 98.2 F | DIASTOLIC BLOOD PRESSURE: 70 MMHG | HEIGHT: 68.5 IN | BODY MASS INDEX: 21.72 KG/M2

## 2024-01-11 DIAGNOSIS — R53.83 OTHER FATIGUE: ICD-10-CM

## 2024-01-11 DIAGNOSIS — U09.9 POST COVID-19 CONDITION, UNSPECIFIED: ICD-10-CM

## 2024-01-11 DIAGNOSIS — F41.9 ANXIETY DISORDER, UNSPECIFIED: ICD-10-CM

## 2024-01-11 DIAGNOSIS — F32.A ANXIETY DISORDER, UNSPECIFIED: ICD-10-CM

## 2024-01-11 DIAGNOSIS — G35 MULTIPLE SCLEROSIS: ICD-10-CM

## 2024-01-11 PROCEDURE — 99205 OFFICE O/P NEW HI 60 MIN: CPT

## 2024-01-11 PROCEDURE — 99215 OFFICE O/P EST HI 40 MIN: CPT

## 2024-01-11 NOTE — REVIEW OF SYSTEMS
[Feeling Tired] : feeling tired [Arthralgias] : arthralgias [Joint Pain] : joint pain [Dry Skin] : dry skin [As Noted in HPI] : as noted in HPI [Sleep Disturbances] : sleep disturbances [Anxiety] : anxiety [Depression] : depression [Negative] : Heme/Lymph [FreeTextEntry2] : fatigued w/body-wide joint pain and myofascial discomfort [FreeTextEntry4] : due for nasal surgery 2/13/2024 to correct a previous deviated septum procedure....gets bad sinus infections and a lot of sinus pressure [FreeTextEntry8] : urinary frequency  [FreeTextEntry9] : most severely in his neck and back  [de-identified] :  Yqbe-Wdda-Bvdh pimples throughout nose, cheeks, and behind his ears

## 2024-01-11 NOTE — DATA REVIEWED
[FreeTextEntry1] : CTScan chest 7/12/23  no lymphadenopathy, no effusions/ Ptx and  RUL wedge resection w/ stable changes, few lung cysts are unchanged, upper abd redemonstrated 3.4 R hepatic hypodensity - overall stable   PFTs    11/2/20 2/28/22 4/5/23  FVC         85               85                   87 TLC          85               84                   75 DLCO 107       88          76

## 2024-01-11 NOTE — ASSESSMENT
[FreeTextEntry1] : 49 y/o wm w/a PMHx of anxiety, RA, IBS, MS, Toeb-Utua-Kuce Syndrome, Pelvic floor disorder, chronic migraines, restrictive lung disease, reactive airway disease, fatigue, sleep disorder, and anemia. He is presenting in office today referred by Dr. Guidry for Long Covid consultation. 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 rare ETOH and denies illicit drug/ or marijuana use  COVID: x 2 each infection lasted 2-6 wks with fever, sore throat, cough/ SOB, malaise, HA, GI sx, and arthralgias/ myalgias.  To ER for treatment but no hospitalization (dates of infections- 2nd course was tx with Paxlovid) 3 COVID vaccines: Moderna each with COVID like sx   COORDINATING PHYSICIANS - PCP/PULM: Dr. Lance Guidry - [facility]UR[facility] [phone] MS expert  1) PCS:  Though describes polyarthritis, mild persistent chronic pain, migraines, tinnitus and intermittent vertigo (not dysautonomia by description) before COVID- reports marked worsening since associated with increase in fatigue. Despite polysomatic c/o as described, no wt loss, lymphadenopathy, fevers  - Pulm:  Notes worsening GOODE now following infection  Upon review note progressive decline in DLCO over past few years from completely normal at 94% to now 75%...  CTscan stable with no new cystic lesions and no additional episodes of pneumothorax- no evidence of ILD or lymphadenopathy.   - Recurrent sinus infections:  marked deviated septum, working with ENT and reconstruction scheduled 2/18/24... certainly recurrent inflammation/ infections can lead to vertigo.   - Fatigue:  marked worsening with significant sleep disruption with latency often (2-3 x/w) 2-4 hours and then describes trouble staying asleep.  Has tried melatonin, CBD and anti histamines with minimal benefit.  Certainly poor, NRS - inefficient/ insufficient sleep can lead to diffuse polyarthralgias/ myalgias as described which has markedly worsened since COVID infection  - Polyarthralgias:  8/10 when severe, every day 5/10... with intermittent bouts of symmetrical pain, stiffness and swelling in BL hands most repeatedly- episodes last hours to days.. at times worse with use.  No specific tx, sx resolve spontaneously.. 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 Not currently exercising- hasn't done anything consistent since COVID.. but in past always felt better when consistent with some form of exercise.   Discussion:  Given presence of one AID- MS, can't r/o that COVID may have triggered another... describes symptoms of possible inflammatory symmetrical polyarthritis- c/w RA though nothing on exam today.   Also can't r/o that some of these symptoms are not related to MS... and all are made worse by marked Anxiety.  CATRACHITA score 18 on our tests- with impact on function reported as "severe difficulty" and describes panic attacks intermittently though "manages" them... without medications for many years.  Denies previous treatments  Baseline ESR was normal - CRP was not assessed.  Have ordered comprehensive rheum studies.    Concerned about impact of anxiety and poor sleep on variety of somatic complaints.  If no new underlying or active existing condition.. would recommend focus on addressing quality sleep, physical conditioning and managing mood disorder.  Wants to minimize medications as much as possible.   - If present treat any active AI disease..  - If needed still address sleep/ anxiety and consider short course of clonazepam/ or could consider temazepam for sleep given degree of anxiety.. if ++ response may benefit from SSRI.   - if not interested in pharmacotherapy:  consider variety of natural sleep therapies to include THC..   2) MS: dx 2006 with mild sx over many years.. most signfiicant visual disturbance (double vision), balance, altered sensation, describes muslce spasticity at times and coordination problems, occas paresthesias worse in hands L >R.  Follows closely with MS expert as noted - on ocrilizumab 600 mg q 6 m, last dose 10/23 (did miss nearly 12 + m during covid and feels MS sx were exacerbated)...   3) Anxiety and mild reactive depression:  years of anxiety with multiple NP strategies to manage.  Though describes clear panic attacks (near syncopal episodes- with at times profound n/v- separate from Vertigo)- has rarely been severely limited by these, though does feel anxiety makes managing work / home difficult at times.  Multiple chronic medical conditions:  MS, Tamara Evangelina Daniella syndrome- both of which took years to diagnose after evaluations from multiple specialists    4) Migraines: stable for many years has tried and failed many therapies. Currently on PRN Nurtec   Plan  - complete labs to include rheumatoid serologies   - discussed starting on medication to help w/ sleep and anxiety Reivewed idea of treating anxiety with Dr Guidry.. agrees this may be helpful..  (Zoloft, clonazepam at night/ tempazepma or sertraline) w/ Dr. Guidry  - could also consider THC (would have to use edibles- absolutely no vape/ or smoking)  - talk to plastic surgeon/ ENT.. is there any vestibular tx that might help.. will addressing chronic congestion help?    - f/u via ttm in 3-4 weeks to go over labs and my discussion w/ Chao   positive

## 2024-01-11 NOTE — CONSULT LETTER
[Dear  ___] : Dear  [unfilled], [Consult Letter:] : I had the pleasure of evaluating your patient, [unfilled]. [Please see my note below.] : Please see my note below. [Consult Closing:] : Thank you very much for allowing me to participate in the care of this patient.  If you have any questions, please do not hesitate to contact me. [Sincerely,] : Sincerely, [FreeTextEntry2] : Lance Hansen MD  [FreeTextEntry1] : Please see below for summary of  recent rheumatology evaluation and recommendations. 47 y/o wm w/a PMHx of anxiety, RA, IBS, MS, Mfml-Kptr-Lyof Syndrome, Pelvic floor disorder, chronic migraines, restrictive lung disease, reactive airway disease, fatigue, sleep disorder, and anemia. He is presenting in office today referred by Dr. Guidry for Long Covid consultation. 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 rare ETOH and denies illicit drug/ or marijuana use  COVID: x 2 each infection lasted 2-6 wks with fever, sore throat, cough/ SOB, malaise, HA, GI sx, and arthralgias/ myalgias.  To ER for treatment but no hospitalization (dates of infections- 2nd course was tx with Paxlovid) 3 COVID vaccines: Moderna each with COVID like sx   COORDINATING PHYSICIANS - PCP/PULM: Dr. Lance Guidry - NEURO: Dr. Cassidy ([facility]) [phone] MS expert  1) PCS:  Though describes polyarthritis, mild persistent chronic pain, migraines, tinnitus and intermittent vertigo (not dysautonomia by description) before COVID- reports marked worsening since associated with increase in fatigue. Despite polysomatic c/o as described, no wt loss, lymphadenopathy, fevers  - Pulm:  Notes worsening GOODE now following infection  Upon review note progressive decline in DLCO over past few years from completely normal at 94% to now 75%...  CTscan stable with no new cystic lesions and no additional episodes of pneumothorax- no evidence of ILD or lymphadenopathy.   - Recurrent sinus infections:  marked deviated septum, working with ENT and reconstruction scheduled 2/18/24... certainly recurrent inflammation/ infections can lead to vertigo.   - Fatigue:  marked worsening with significant sleep disruption with latency often (2-3 x/w) 2-4 hours and then describes trouble staying asleep.  Has tried melatonin, CBD and anti histamines with minimal benefit.  Certainly poor, NRS - inefficient/ insufficient sleep can lead to diffuse polyarthralgias/ myalgias as described which has markedly worsened since COVID infection  - Polyarthralgias:  8/10 when severe, every day 5/10... with intermittent bouts of symmetrical pain, stiffness and swelling in BL hands most repeatedly- episodes last hours to days.. at times worse with use.  No specific tx, sx resolve spontaneously.. 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 Not currently exercising- hasn't done anything consistent since COVID.. but in past always felt better when consistent with some form of exercise.   Discussion:  Given presence of one AID- MS, can't r/o that COVID may have triggered another... describes symptoms of possible inflammatory symmetrical polyarthritis- c/w RA though nothing on exam today.   Also can't r/o that some of these symptoms are not related to MS... and all are made worse by marked Anxiety.  CATRACHITA score 18 on our tests- with impact on function reported as "severe difficulty" and describes panic attacks intermittently though "manages" them... without medications for many years.  Denies previous treatments  Baseline ESR was normal - CRP was not assessed.  Have ordered comprehensive rheum studies.    Concerned about impact of anxiety and poor sleep on variety of somatic complaints.  If no new underlying or active existing condition.. would recommend focus on addressing quality sleep, physical conditioning and managing mood disorder.  Wants to minimize medications as much as possible.   - If present treat any active AI disease..  - If needed still address sleep/ anxiety and consider short course of clonazepam/ or could consider temazepam for sleep given degree of anxiety.. if ++ response may benefit from SSRI.   - if not interested in pharmacotherapy:  consider variety of natural sleep therapies to include THC..   2) MS: dx 2006 with mild sx over many years.. most signfiicant visual disturbance (double vision), balance, altered sensation, describes muslce spasticity at times and coordination problems, occas paresthesias worse in hands L >R.  Follows closely with MS expert as noted - on ocrilizumab 600 mg q 6 m, last dose 10/23 (did miss nearly 12 + m during covid and feels MS sx were exacerbated)...   3) Anxiety and mild reactive depression:  years of anxiety with multiple NP strategies to manage.  Though describes clear panic attacks (near syncopal episodes- with at times profound n/v- separate from Vertigo)- has rarely been severely limited by these, though does feel anxiety makes managing work / home difficult at times.  Multiple chronic medical conditions:  MS, Tamara Evangelina Daniella syndrome- both of which took years to diagnose after evaluations from multiple specialists    4) Migraines: stable for many years has tried and failed many therapies. Currently on PRN Nurtec   Plan  - complete labs to include rheumatoid serologies   - discussed starting on medication to help w/ sleep and anxiety Reivewed idea of treating anxiety with Dr Guidry.. agrees this may be helpful..  (Zoloft, clonazepam at night/ tempazepma or sertraline) w/ Dr. Guidry  - could also consider THC (would have to use edibles- absolutely no vape/ or smoking)  - talk to plastic surgeon/ ENT.. is there any vestibular tx that might help.. will addressing chronic congestion help?    - f/u via ttm in 3-4 weeks to go over labs and my discussion w/ Chao  [FreeTextEntry3] : Jenny Bryant DNP, ANP-C Division or Rheumatology Director, Fibromyalgia/ [facility]  [facility] and Division of Rheumatology, [facility]   [DrChristiano  ___] : Dr. SOLITARIO

## 2024-01-11 NOTE — HISTORY OF PRESENT ILLNESS
[FreeTextEntry1] : Initial HPI 1/11/2024  49 y/o wm w/a PMHx of anxiety, RA, IBS, MS, Hfqb-Koof-Scdb Syndrome, Pelvic floor disorder, chronic migraines, restrictive lung disease, reactive airway disease, fatigue, sleep disorder, and anemia. He is presenting in office today referred by Dr. Guidry for Long Covid consultation.  COORDINATING PHYSICIANS - PCP/PULM: Dr. Lance Guidry - NEURO: Dr. Ange CUELLOID - tested positive twice each lasting 2-6 weeks  - symptoms included a fever, a cough, malaise, headaches, GI symptoms, a sore throat, myofascial pain, SOB on exertion, and ear pain/recurring tinnitus - describes his symptoms as being severe - prior to testing positive for COVID he already had tinnitus and dizziness, but it became exacerbated once he was infected w/COVID - he was hospitalized in the ER due to his COVID condition and reports he was treated w/ Paxlovid.....besides Paxlovid he took Tylenol / Motrin for his symptoms  LONG COVID SYMPTOMS - since having COVID he has continued to experience vertigo like symptoms, ear issues, and eye issues - chronic joint pain.... reports he has always had back issues but now it is throughout his joints - does have some myofascial symptoms but unsure exactly how to describe it  - he is not sure exactly if his symptoms can be explained by his MS, his age, or COVID....does report he had to miss two doses over the course of the pandemic and noticed that he went down hill   JOINTS - reports his hands are constantly swollen, but able to make a fist  - reports he is still able to play the guitar, but he notices he has dexterity issues - sometimes gets pain and swelling in the knees as well  - denies his joints are killer, but rates his pain 8-9/10 when it is severe mainly in his neck and back and on a normal day it is 5/10  VERTIGO - when he gets them he has a hard time getting up and sometimes they are severe  - gets about 3-4 times a year and notices it when he eats certain foods (like ice cream) and doing different types of activities  - believes this could be due to his MS...denies it being due to COVID  PSYCH - notices he has theses weird syncope episodes where he vomits at times and other times he has nausea and can't get up....reports its all anxiety induced  - when he was younger he had several bouts of severe anxiety....previously thought it showed weakness - denies that is stops him from doing things, but he does realize that he has it and believes he also has some OCD which makes it worse  VACCINATIONS - received 3 doses of Moderna and experienced COVID-like symptoms  HIGH RISK MEDICAL ISSUES  - chronic lung disease: restrictive lung disease, reactive airway disease - chronic kidney disease - neurodevelopmental disorders or other conditions that confer medical complexity  MS - was dx around 2006 and saw three different neurologists before getting the dx - his first symptom was double vision that lasted awhile.....a couple days later he presented to a doctor who went through his entire record and told him to see an ophthalmologist - underwent a few exams and was told he meets criteria, but at the same time doesn't meet criteria  - his current neurologist was unimpressed about his presenting imaging and symptoms....around 39 he started getting more symptoms (balance issues, aches, forgetfulness, and a few other things) and was started on the Ocrevus infusions (last dose was 10/2023)  FUNCTION - reports he is very active and knows his limitations  - if he need to he can run, but gets winded fast  - overall does feel good when he exercises......previously rode 11 miles on a bike and it would help him clear his head, but hasn't been doing it recently   SLEEP - very poor and nonrestorative w/ significant difficulty falling asleep (latency of 2-4 hrs when he is anxious/having a panic attack) and staying asleep - attributes sleep issues to body aches, discomfort, headaches, and anxiety - he has tried melatonin, CBD, and NyQuil - during the day he feel very fatigue and could go to sleep easily, but at night he is just up   PULM  (look up PFTs) - has a spontaneous pneumothorax on his R lung and had to have an emergency pleurodesis - again stresses he knows his limitations - scans his lungs for tumors every year due to his Tnkw-Mqiv-Okvd Syndrome (abdomen is checked every 3-4 years unless he is symptomatic)  NUTRITION / EXERCISE - eats daily recommended serving of fruits and vegetables occasionally - always drinks the daily recommended glasses of water - always eats real food, but occasionally eats mindfully - never engages in strengthening and moderate intensity aerobic exercises, but stretches occasionally - always maintains an ideal wt and has never gained more than 11 lbs in his adulthood  SURGICAL HX - hernia repair - sinus surgery - collapsed lung pleurodesis  SOCIAL HX - member of an unmarried couple w/ no kids currently living w/his significant other - 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

## 2024-01-11 NOTE — PHYSICAL EXAM
[General Appearance - Alert] : alert [General Appearance - In No Acute Distress] : in no acute distress [Sclera] : the sclera and conjunctiva were normal [PERRL With Normal Accommodation] : pupils were equal in size, round, and reactive to light [Extraocular Movements] : extraocular movements were intact [Outer Ear] : the ears and nose were normal in appearance [Oropharynx] : the oropharynx was normal [Neck Cervical Mass (___cm)] : no neck mass was observed [Neck Appearance] : the appearance of the neck was normal [Jugular Venous Distention Increased] : there was no jugular-venous distention [Thyroid Diffuse Enlargement] : the thyroid was not enlarged [Thyroid Nodule] : there were no palpable thyroid nodules [Respiration, Rhythm And Depth] : normal respiratory rhythm and effort [Auscultation Breath Sounds / Voice Sounds] : lungs were clear to auscultation bilaterally [Heart Rate And Rhythm] : heart rate was normal and rhythm regular [Heart Sounds] : normal S1 and S2 [Heart Sounds Gallop] : no gallops [Murmurs] : no murmurs [Heart Sounds Pericardial Friction Rub] : no pericardial rub [Full Pulse] : the pedal pulses are present [Edema] : there was no peripheral edema [Bowel Sounds] : normal bowel sounds [Abdomen Soft] : soft [Abdomen Tenderness] : non-tender [Abdomen Mass (___ Cm)] : no abdominal mass palpated [Cervical Lymph Nodes Enlarged Posterior Bilaterally] : posterior cervical [Cervical Lymph Nodes Enlarged Anterior Bilaterally] : anterior cervical [Supraclavicular Lymph Nodes Enlarged Bilaterally] : supraclavicular [No CVA Tenderness] : no ~M costovertebral angle tenderness [No Spinal Tenderness] : no spinal tenderness [Abnormal Walk] : normal gait [Nail Clubbing] : no clubbing  or cyanosis of the fingernails [Musculoskeletal - Swelling] : no joint swelling seen [Motor Tone] : muscle strength and tone were normal [Skin Color & Pigmentation] : normal skin color and pigmentation [Skin Turgor] : normal skin turgor [] : no rash [Sensation] : the sensory exam was normal to light touch and pinprick [No Focal Deficits] : no focal deficits [Oriented To Time, Place, And Person] : oriented to person, place, and time [Impaired Insight] : insight and judgment were intact [Affect] : the affect was normal [FreeTextEntry1] : PSD 38, CATRACHITA 18, PHQ 12

## 2024-01-12 ENCOUNTER — NON-APPOINTMENT (OUTPATIENT)
Age: 49
End: 2024-01-12

## 2024-01-12 ENCOUNTER — APPOINTMENT (OUTPATIENT)
Dept: OPHTHALMOLOGY | Facility: CLINIC | Age: 49
End: 2024-01-12
Payer: COMMERCIAL

## 2024-01-12 PROCEDURE — 99214 OFFICE O/P EST MOD 30 MIN: CPT

## 2024-01-12 PROCEDURE — 92133 CPTRZD OPH DX IMG PST SGM ON: CPT

## 2024-01-12 PROCEDURE — 92083 EXTENDED VISUAL FIELD XM: CPT

## 2024-01-16 NOTE — ASSESSMENT
[FreeTextEntry1] : 48 year old male with deviated septum, nasal valve collapse, BITH - we discussed revision septoplasty, nasal valve repair, and BITR.   I discussed the risks, benefits, and alternatives for functional septoplasty and nasal valve repair. Risks include bleeding, infection, need for revision, postoperative swelling. We discussed risk of septal perforation, persistent nasal obstruction or recurrent nasal obstruction, scarring, synechiae. We also discussed alternatives including continued flonase and breathe right strip use.   I explained the surgery with nasal diagrams. We will plan for a functional septoplasty,  grafts, lateral crural strut grafts for nasal valve repair. We will also plan for inferior turbinate reduction.There are no cosmetic concerns.  Pre-operative photos were taken today.   Will proceed with booking surgery.

## 2024-01-16 NOTE — HISTORY OF PRESENT ILLNESS
[de-identified] : 48 year old man referred by Dr. Lao for possible nasal valve collapse. PMH: MS and Iskt-Hlha-Xdmm syndrome  Hx of sinus surgery about 15-20 years ago to repair a deviated septum Reports bilateral nasal congestion, frequent sinus pain and pressure, post nasal drip with occasional green mucous production, clear nasal discharge, epistaxis or recent fevers. Reports he has gets frequent sinus infections L>R--last one was about 2-3 weeks ago.  Reports he has a sensitive sense of smell.  CT Sinuses about 1 month ago--Forgot disc and report at home

## 2024-01-16 NOTE — PHYSICAL EXAM
[Nasal Endoscopy Performed] : nasal endoscopy was performed, see procedure section for findings [Midline] : trachea located in midline position [de-identified] : deviated septum positive ish maneuver bilaterally BITH [Normal] : no rashes

## 2024-01-16 NOTE — PROCEDURE
[FreeTextEntry1] : Nasal endoscopy [FreeTextEntry2] : Nasal obstruction [FreeTextEntry3] : Procedure: nasal endoscopy   Pre-operative diagnosis:   Indication: Anterior rhinoscopy insufficient to diagnose pathology  Details: After decongestant and lidocaine was sprayed in the bilateral nasal cavities, a flexible laryngoscope was inserted into the right nares. The nasal cavity, middle meatus, ETO, nasopharynx, and glottis were visualized. The endoscope was then inserted into the left nares and the nasal cavity, middle meatus, and ETO was visualized. The patient tolerated procedure well.  Findings: deviated septum BITH

## 2024-01-25 ENCOUNTER — LABORATORY RESULT (OUTPATIENT)
Age: 49
End: 2024-01-25

## 2024-01-30 ENCOUNTER — OUTPATIENT (OUTPATIENT)
Dept: OUTPATIENT SERVICES | Facility: HOSPITAL | Age: 49
LOS: 1 days | End: 2024-01-30
Payer: COMMERCIAL

## 2024-01-30 VITALS
HEIGHT: 68 IN | RESPIRATION RATE: 18 BRPM | SYSTOLIC BLOOD PRESSURE: 111 MMHG | HEART RATE: 63 BPM | DIASTOLIC BLOOD PRESSURE: 71 MMHG | WEIGHT: 147.71 LBS | OXYGEN SATURATION: 97 % | TEMPERATURE: 98 F

## 2024-01-30 DIAGNOSIS — Z98.890 OTHER SPECIFIED POSTPROCEDURAL STATES: Chronic | ICD-10-CM

## 2024-01-30 DIAGNOSIS — J32.9 CHRONIC SINUSITIS, UNSPECIFIED: ICD-10-CM

## 2024-01-30 DIAGNOSIS — J34.2 DEVIATED NASAL SEPTUM: ICD-10-CM

## 2024-01-30 DIAGNOSIS — G35 MULTIPLE SCLEROSIS: ICD-10-CM

## 2024-01-30 DIAGNOSIS — Z01.818 ENCOUNTER FOR OTHER PREPROCEDURAL EXAMINATION: ICD-10-CM

## 2024-01-30 PROCEDURE — 93005 ELECTROCARDIOGRAM TRACING: CPT

## 2024-01-30 PROCEDURE — G0463: CPT

## 2024-01-30 PROCEDURE — 80048 BASIC METABOLIC PNL TOTAL CA: CPT

## 2024-01-30 PROCEDURE — 85027 COMPLETE CBC AUTOMATED: CPT

## 2024-01-30 PROCEDURE — 36415 COLL VENOUS BLD VENIPUNCTURE: CPT

## 2024-01-30 PROCEDURE — 93010 ELECTROCARDIOGRAM REPORT: CPT | Mod: NC

## 2024-01-30 NOTE — H&P PST ADULT - NSICDXPASTSURGICALHX_GEN_ALL_CORE_FT
PAST SURGICAL HISTORY:  H/O inguinal hernia repair     H/O sinus surgery     History of lung surgery

## 2024-01-30 NOTE — H&P PST ADULT - PROBLEM SELECTOR PLAN 1
Patient provided with pre-operative instructions and verbalized understanding.  Patient will be NPO on day of surgery. Patient will stop NSAIDs, aspirin, herbal supplements or vitamins 1 week prior to surgery.  Chlorhexidine wash provided with instructions, verbalized understanding.     Pending medical clearance as per surgeon.

## 2024-01-30 NOTE — H&P PST ADULT - HISTORY OF PRESENT ILLNESS
cant breathe, chronic ear infection, congestion     last PO abx in decemenr  Patient is a 48 year old M with PMHx of MS presents for perioperative testing for septoplasty, bilateral inferior turbinate reduction, nasal valve repair with Dr. Edith Begum scheduled for 02/13/2024. Reports bilateral nasal congestion, frequent sinus pain and pressure, post nasal drip, chronic ear infections, clear nasal discharge and occasional sinus pressure. Reports last sinus infection was in December treated with PO abx. Patient has opted for surgical intervention. Denies any acute symptoms at this time. Patient otherwise feels well overall.

## 2024-01-30 NOTE — H&P PST ADULT - NSANTHOSAYNRD_GEN_A_CORE
neck 17 inches/No. DELORES screening performed.  STOP BANG Legend: 0-2 = LOW Risk; 3-4 = INTERMEDIATE Risk; 5-8 = HIGH Risk

## 2024-01-30 NOTE — H&P PST ADULT - COMMENTS
Denies h/o or family h/o DVT, PE  Denies bleeding or clotting disorders  Denies dentures/partials, loose teeth/caps Denies h/o or family h/o DVT, PE  Denies dentures/partials, loose teeth/caps

## 2024-02-01 ENCOUNTER — APPOINTMENT (OUTPATIENT)
Dept: RHEUMATOLOGY | Facility: CLINIC | Age: 49
End: 2024-02-01
Payer: COMMERCIAL

## 2024-02-01 LAB
ALBUMIN SERPL ELPH-MCNC: 4.6 G/DL
ALP BLD-CCNC: 75 U/L
ALT SERPL-CCNC: 13 U/L
ANION GAP SERPL CALC-SCNC: 11 MMOL/L
AST SERPL-CCNC: 14 U/L
BILIRUB SERPL-MCNC: 1.1 MG/DL
BUN SERPL-MCNC: 9 MG/DL
C3 SERPL-MCNC: 86 MG/DL
C4 SERPL-MCNC: 18 MG/DL
CALCIUM SERPL-MCNC: 9.2 MG/DL
CCP AB SER IA-ACNC: <8 UNITS
CD16+CD56+ CELLS # BLD: 180 CELLS/UL
CD16+CD56+ CELLS NFR BLD: 13 %
CD19 CELLS NFR BLD: 1 CELLS/UL
CD3 CELLS # BLD: 1170 CELLS/UL
CD3 CELLS NFR BLD: 86 %
CD3+CD4+ CELLS # BLD: 952 CELLS/UL
CD3+CD4+ CELLS NFR BLD: 70 %
CD3+CD4+ CELLS/CD3+CD8+ CLL SPEC: 4.61 RATIO
CD3+CD8+ CELLS # SPEC: 207 CELLS/UL
CD3+CD8+ CELLS NFR BLD: 15 %
CELLS.CD3-CD19+/CELLS IN BLOOD: <1 %
CENTROMERE IGG SER-ACNC: <0.2 AL
CHLORIDE SERPL-SCNC: 102 MMOL/L
CK SERPL-CCNC: 45 U/L
CO2 SERPL-SCNC: 26 MMOL/L
CREAT SERPL-MCNC: 0.9 MG/DL
CRP SERPL-MCNC: <3 MG/L
DEPRECATED KAPPA LC FREE/LAMBDA SER: 1.23 RATIO
EGFR: 105 ML/MIN/1.73M2
ENA SCL70 IGG SER IA-ACNC: <0.2 AL
GLUCOSE SERPL-MCNC: 134 MG/DL
HCT VFR BLD CALC: 37.9 %
HGB BLD-MCNC: 11.4 G/DL
IGA SER QL IEP: 141 MG/DL
IGG SER QL IEP: 752 MG/DL
IGM SER QL IEP: 34 MG/DL
KAPPA LC CSF-MCNC: 1.36 MG/DL
KAPPA LC SERPL-MCNC: 1.67 MG/DL
MCHC RBC-ENTMCNC: 18 PG
MCHC RBC-ENTMCNC: 30.1 GM/DL
MCV RBC AUTO: 59.7 FL
MYELOPEROXIDASE AB SER QL IA: <5 UNITS
MYELOPEROXIDASE CELLS FLD QL: NEGATIVE
PLATELET # BLD AUTO: 247 K/UL
POTASSIUM SERPL-SCNC: 3.4 MMOL/L
PROT SERPL-MCNC: 6.5 G/DL
PROTEINASE3 AB SER IA-ACNC: <5 UNITS
PROTEINASE3 AB SER-ACNC: NEGATIVE
RBC # BLD: 6.35 M/UL
RBC # FLD: 17.2 %
RF+CCP IGG SER-IMP: NEGATIVE
RHEUMATOID FACT SER QL: <10 IU/ML
SODIUM SERPL-SCNC: 139 MMOL/L
WBC # FLD AUTO: 6.46 K/UL

## 2024-02-01 PROCEDURE — 99443: CPT

## 2024-02-01 RX ORDER — AMITRIPTYLINE HYDROCHLORIDE 10 MG/1
10 TABLET, FILM COATED ORAL
Qty: 270 | Refills: 1 | Status: ACTIVE | COMMUNITY
Start: 2024-02-01 | End: 1900-01-01

## 2024-02-02 ENCOUNTER — NON-APPOINTMENT (OUTPATIENT)
Age: 49
End: 2024-02-02

## 2024-02-02 ENCOUNTER — APPOINTMENT (OUTPATIENT)
Dept: INTERNAL MEDICINE | Facility: CLINIC | Age: 49
End: 2024-02-02
Payer: COMMERCIAL

## 2024-02-02 VITALS
RESPIRATION RATE: 16 BRPM | WEIGHT: 142 LBS | DIASTOLIC BLOOD PRESSURE: 80 MMHG | SYSTOLIC BLOOD PRESSURE: 120 MMHG | OXYGEN SATURATION: 97 % | HEIGHT: 68.5 IN | HEART RATE: 59 BPM | BODY MASS INDEX: 21.27 KG/M2 | TEMPERATURE: 98.4 F

## 2024-02-02 DIAGNOSIS — J34.2 DEVIATED NASAL SEPTUM: ICD-10-CM

## 2024-02-02 DIAGNOSIS — J32.9 CHRONIC SINUSITIS, UNSPECIFIED: ICD-10-CM

## 2024-02-02 DIAGNOSIS — R94.31 ABNORMAL ELECTROCARDIOGRAM [ECG] [EKG]: ICD-10-CM

## 2024-02-02 PROCEDURE — 93000 ELECTROCARDIOGRAM COMPLETE: CPT

## 2024-02-02 PROCEDURE — 99214 OFFICE O/P EST MOD 30 MIN: CPT | Mod: 25

## 2024-02-02 PROCEDURE — 94010 BREATHING CAPACITY TEST: CPT

## 2024-02-02 RX ORDER — OFLOXACIN 3 MG/ML
0.3 SOLUTION/ DROPS OPHTHALMIC
Refills: 0 | Status: DISCONTINUED | COMMUNITY
End: 2024-02-02

## 2024-02-02 RX ORDER — SODIUM CHLORIDE 50 MG/G
5 OINTMENT OPHTHALMIC
Refills: 0 | Status: DISCONTINUED | COMMUNITY
End: 2024-02-02

## 2024-02-02 RX ORDER — UBIDECARENONE/VIT E ACET 100MG-5
CAPSULE ORAL
Refills: 0 | Status: DISCONTINUED | COMMUNITY
End: 2024-02-02

## 2024-02-02 NOTE — PLAN
[FreeTextEntry1] : 1. The patient is planning to undergo the functional septoplasty on February 13th.   2. Follow-up in 6 months with full PFT.  3. Patient will begin P.M. use of amitryptyline following the above-noted procedure   4. Continue to follow with neurology regarding MS   5. Patient will undergo a repeat CAT scan of chest in July 2025  6. Patient will undergo a CAT scan of abdomen and pelvis in 2025 due to potential risk of renal cell carcinoma secondary to Xbve-Shel-Ktjj syndrome  Addendum: There are no absolute contraindications to the preplanned procedure.  The patient is medically optimized at this time.

## 2024-02-02 NOTE — REVIEW OF SYSTEMS
[Negative] : Heme/Lymph [Nasal Discharge] : nasal discharge [FreeTextEntry4] : See history of present illness

## 2024-02-02 NOTE — DATA REVIEWED
[FreeTextEntry1] : Preoperative blood work is reviewed.  He is noted to have mild anemia, which has been chronic.  His platelet count is normal.  EKG reveals sinus bradycardia rate of 57.  There are normal intervals and axis.  An incomplete right bundle branch block may be present.  Spirometric analysis reveals a slight decrease in the forced vital capacity consistent with mild restriction.  An obstructive process is not noted.

## 2024-02-02 NOTE — HISTORY OF PRESENT ILLNESS
[FreeTextEntry1] : The patient presents for a preoperative medical evaluation and clearance.  [de-identified] : Mr. POSADA has been having some issues with his sinuses recently, and is planning to undergo a nasal valve reconstruction due to a possible nasal valve collapse. The patient underwent sinus surgery about 15-20 years ago to repair a deviated septum. At this time, he continues to experience bilateral nasal congestion as well as sinus pain and pressure.  He has had issues with chronic sinusitis, requiring treatment with nasal corticosteroids, and antibiotics.  Recently, his symptoms worsened.  He went to an ENT physician, who performed a CAT scan of the sinuses approximately 1 month ago.  That revealed the deviated septum, with evidence for chronic inflammatory changes.  He was then evaluated by a ENT surgeon, that is also a cosmetic surgeon, and she is now planning on doing extensive surgery to repair his nasal valves, the deviated septum, as well as the chronic sinus issues.  He is relatively asymptomatic with regards to purulent sputum at this time.  The patient has additionally been experiencing long COVID-like symptoms including vertigo, eye issues, and chronic joint pain. He consulted with his rheumatologist, Dr. Bryant, who prescribed him with amitriptyline 10-30 mg/night. The patient has not begun this medication regimen, as he is waiting to have his nasal procedure first. There has been no chest pain, shortness of breath, palpitations, or PND. There have been no other acute constitutional symptoms. He comes in for this assessment.

## 2024-02-02 NOTE — PHYSICAL EXAM
[Coordination Grossly Intact] : coordination grossly intact [Normal Gait] : normal gait [Normal Affect] : the affect was normal [Normal Insight/Judgement] : insight and judgment were intact [General Appearance - Alert] : alert [General Appearance - In No Acute Distress] : in no acute distress [Sclera] : the sclera and conjunctiva were normal [PERRL With Normal Accommodation] : pupils were equal in size, round, and reactive to light [Extraocular Movements] : extraocular movements were intact [Outer Ear] : the ears and nose were normal in appearance [Oropharynx] : the oropharynx was normal [Neck Appearance] : the appearance of the neck was normal [Neck Cervical Mass (___cm)] : no neck mass was observed [Jugular Venous Distention Increased] : there was no jugular-venous distention [Thyroid Diffuse Enlargement] : the thyroid was not enlarged [Thyroid Nodule] : there were no palpable thyroid nodules [Auscultation Breath Sounds / Voice Sounds] : lungs were clear to auscultation bilaterally [Heart Rate And Rhythm] : heart rate was normal and rhythm regular [Heart Sounds] : normal S1 and S2 [Heart Sounds Gallop] : no gallops [Murmurs] : no murmurs [Heart Sounds Pericardial Friction Rub] : no pericardial rub [Arterial Pulses Carotid] : carotid pulses were normal with no bruits [Edema] : there was no peripheral edema [Bowel Sounds] : normal bowel sounds [Abdomen Soft] : soft [Abdomen Tenderness] : non-tender [] : no hepato-splenomegaly [Abdomen Mass (___ Cm)] : no abdominal mass palpated [No CVA Tenderness] : no ~M costovertebral angle tenderness [No Spinal Tenderness] : no spinal tenderness [Nail Clubbing] : no clubbing  or cyanosis of the fingernails [de-identified] : 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

## 2024-02-03 PROBLEM — G35 MULTIPLE SCLEROSIS: Chronic | Status: ACTIVE | Noted: 2024-01-30

## 2024-02-03 PROBLEM — D56.3 THALASSEMIA MINOR: Chronic | Status: ACTIVE | Noted: 2024-01-30

## 2024-02-12 ENCOUNTER — TRANSCRIPTION ENCOUNTER (OUTPATIENT)
Age: 49
End: 2024-02-12

## 2024-02-12 LAB
EJ AB SER QL: NEGATIVE
ENA JO1 AB SER IA-ACNC: <20 UNITS
ENA PM/SCL AB SER-ACNC: <20 UNITS
ENA SM+RNP AB SER IA-ACNC: <20 UNITS
ENA SS-A IGG SER QL: <20 UNITS
FIBRILLARIN AB SER QL: NEGATIVE
KU AB SER QL: NEGATIVE
MDA-5 (P140)(CADM-140): <20 UNITS
MI2 AB SER QL: NEGATIVE
NXP-2 (P140): <20 UNITS
OJ AB SER QL: NEGATIVE
PL12 AB SER QL: NEGATIVE
PL7 AB SER QL: NEGATIVE
SRP AB SERPL QL: NEGATIVE
TIF GAMMA (P155/140): <20 UNITS
U2 SNRNP AB SER QL: NEGATIVE

## 2024-02-12 RX ORDER — OCRELIZUMAB 300 MG/10ML
600 INJECTION INTRAVENOUS
Refills: 0 | DISCHARGE

## 2024-02-12 NOTE — ASU PATIENT PROFILE, ADULT - NSICDXPASTSURGICALHX_GEN_ALL_CORE_FT
PAST SURGICAL HISTORY:  H/O inguinal hernia repair     H/O sinus surgery     History of lung surgery      PAST SURGICAL HISTORY:  H/O inguinal hernia repair right    H/O sinus surgery     History of lung surgery

## 2024-02-12 NOTE — ASU PATIENT PROFILE, ADULT - FALL HARM RISK - UNIVERSAL INTERVENTIONS
Bed in lowest position, wheels locked, appropriate side rails in place/Call bell, personal items and telephone in reach/Instruct patient to call for assistance before getting out of bed or chair/Non-slip footwear when patient is out of bed/[place] to call system/Physically safe environment - no spills, clutter or unnecessary equipment/Purposeful Proactive Rounding/Room/bathroom lighting operational, light cord in reach

## 2024-02-13 ENCOUNTER — OUTPATIENT (OUTPATIENT)
Dept: OUTPATIENT SERVICES | Facility: HOSPITAL | Age: 49
LOS: 1 days | End: 2024-02-13
Payer: COMMERCIAL

## 2024-02-13 ENCOUNTER — RESULT REVIEW (OUTPATIENT)
Age: 49
End: 2024-02-13

## 2024-02-13 ENCOUNTER — TRANSCRIPTION ENCOUNTER (OUTPATIENT)
Age: 49
End: 2024-02-13

## 2024-02-13 ENCOUNTER — APPOINTMENT (OUTPATIENT)
Dept: OTOLARYNGOLOGY | Facility: HOSPITAL | Age: 49
End: 2024-02-13

## 2024-02-13 VITALS
SYSTOLIC BLOOD PRESSURE: 109 MMHG | TEMPERATURE: 97 F | DIASTOLIC BLOOD PRESSURE: 69 MMHG | RESPIRATION RATE: 14 BRPM | WEIGHT: 147.71 LBS | HEART RATE: 61 BPM | HEIGHT: 68 IN | OXYGEN SATURATION: 96 %

## 2024-02-13 VITALS
RESPIRATION RATE: 16 BRPM | OXYGEN SATURATION: 97 % | SYSTOLIC BLOOD PRESSURE: 129 MMHG | DIASTOLIC BLOOD PRESSURE: 70 MMHG | HEART RATE: 65 BPM | TEMPERATURE: 97 F

## 2024-02-13 DIAGNOSIS — Z98.890 OTHER SPECIFIED POSTPROCEDURAL STATES: Chronic | ICD-10-CM

## 2024-02-13 DIAGNOSIS — J32.9 CHRONIC SINUSITIS, UNSPECIFIED: ICD-10-CM

## 2024-02-13 DIAGNOSIS — J34.2 DEVIATED NASAL SEPTUM: ICD-10-CM

## 2024-02-13 LAB
GLUCOSE BLDC GLUCOMTR-MCNC: 123 MG/DL — HIGH (ref 70–99)
SURGICAL PATHOLOGY STUDY: SIGNIFICANT CHANGE UP

## 2024-02-13 PROCEDURE — 88300 SURGICAL PATH GROSS: CPT

## 2024-02-13 PROCEDURE — 93010 ELECTROCARDIOGRAM REPORT: CPT

## 2024-02-13 PROCEDURE — 30465 REPAIR NASAL STENOSIS: CPT

## 2024-02-13 PROCEDURE — 30520 REPAIR OF NASAL SEPTUM: CPT

## 2024-02-13 PROCEDURE — 93005 ELECTROCARDIOGRAM TRACING: CPT

## 2024-02-13 PROCEDURE — 82962 GLUCOSE BLOOD TEST: CPT

## 2024-02-13 PROCEDURE — 30140 RESECT INFERIOR TURBINATE: CPT

## 2024-02-13 PROCEDURE — 30140 RESECT INFERIOR TURBINATE: CPT | Mod: 50

## 2024-02-13 PROCEDURE — 88300 SURGICAL PATH GROSS: CPT | Mod: 26

## 2024-02-13 PROCEDURE — C9399: CPT

## 2024-02-13 RX ORDER — SODIUM CHLORIDE 9 MG/ML
1000 INJECTION, SOLUTION INTRAVENOUS
Refills: 0 | Status: DISCONTINUED | OUTPATIENT
Start: 2024-02-13 | End: 2024-02-13

## 2024-02-13 RX ORDER — ONDANSETRON 8 MG/1
4 TABLET, FILM COATED ORAL ONCE
Refills: 0 | Status: DISCONTINUED | OUTPATIENT
Start: 2024-02-13 | End: 2024-02-13

## 2024-02-13 RX ORDER — ONDANSETRON 8 MG/1
4 TABLET, FILM COATED ORAL ONCE
Refills: 0 | Status: COMPLETED | OUTPATIENT
Start: 2024-02-13 | End: 2024-02-13

## 2024-02-13 RX ORDER — ACETAMINOPHEN 500 MG
650 TABLET ORAL EVERY 6 HOURS
Refills: 0 | Status: DISCONTINUED | OUTPATIENT
Start: 2024-02-13 | End: 2024-02-27

## 2024-02-13 RX ORDER — ONDANSETRON 8 MG/1
1 TABLET, FILM COATED ORAL
Qty: 1 | Refills: 0
Start: 2024-02-13

## 2024-02-13 RX ORDER — ERGOCALCIFEROL 1.25 MG/1
0 CAPSULE ORAL
Refills: 0 | DISCHARGE

## 2024-02-13 RX ORDER — OXYCODONE HYDROCHLORIDE 5 MG/1
1 TABLET ORAL
Qty: 15 | Refills: 0
Start: 2024-02-13

## 2024-02-13 RX ORDER — HYDROMORPHONE HYDROCHLORIDE 2 MG/ML
0.5 INJECTION INTRAMUSCULAR; INTRAVENOUS; SUBCUTANEOUS ONCE
Refills: 0 | Status: DISCONTINUED | OUTPATIENT
Start: 2024-02-13 | End: 2024-02-13

## 2024-02-13 RX ADMIN — ONDANSETRON 4 MILLIGRAM(S): 8 TABLET, FILM COATED ORAL at 12:08

## 2024-02-13 RX ADMIN — SODIUM CHLORIDE 50 MILLILITER(S): 9 INJECTION, SOLUTION INTRAVENOUS at 07:43

## 2024-02-13 RX ADMIN — Medication 650 MILLIGRAM(S): at 15:34

## 2024-02-13 RX ADMIN — HYDROMORPHONE HYDROCHLORIDE 0.5 MILLIGRAM(S): 2 INJECTION INTRAMUSCULAR; INTRAVENOUS; SUBCUTANEOUS at 09:35

## 2024-02-13 NOTE — BRIEF OPERATIVE NOTE - NSICDXBRIEFPREOP_GEN_ALL_CORE_FT
PRE-OP DIAGNOSIS:  Nasal septal deviation 13-Feb-2024 09:26:00  Edith Begum  Nasal valve collapse 13-Feb-2024 09:26:08  Edith Begum

## 2024-02-13 NOTE — CHART NOTE - NSCHARTNOTEFT_GEN_A_CORE
Rapid response called on patient by RN, Vasovagal episode s/p nasal septoplasty/repair of nasal valve and 0.5 mg Dilaudid. Patient seen and examined at bedside in no acute distress. AAOx3, lungs clear b/l and regular rate and rhythm on auscultation. Patient received 1L bolus with /63, HR 69 and O2 99 on 2L. Temp of 36.3. Patient complains of fear of nausea if he sits up. No, headache, vomiting or abdominal pain at this time.        T(C): 36.6 (02-13-24 @ 09:18), Max: 36.6 (02-13-24 @ 09:18)  HR: 62 (02-13-24 @ 10:00) (61 - 78)  BP: 117/71 (02-13-24 @ 10:00) (109/69 - 146/86)  RR: 14 (02-13-24 @ 10:00) (12 - 22)  SpO2: 96% (02-13-24 @ 10:00) (95% - 99%)      PHYSICAL EXAM:  Constitutional: NAD, awake and alert, well-developed  HEENT: PERR, EOMI, Normal Hearing, MMM  Neck: Soft and supple,   Respiratory: Breath sounds are clear bilaterally, No wheezing, rales or rhonchi  Cardiovascular: S1 and S2, regular rate and rhythm, no Murmurs,   Gastrointestinal: Bowel Sounds present, soft, nontender,   Extremities: No peripheral edema  Vascular: 2+ peripheral pulses  Neurological: A/O x 3, no focal deficits        Assessment    Plan  - c/w 1L bolus of NS  - Discussed case w/RN who is aware and will contact MD w/further concerns should they arise

## 2024-02-13 NOTE — ASU DISCHARGE PLAN (ADULT/PEDIATRIC) - CARE PROVIDER_API CALL
Edith Begum  Otolaryngology  [address] [place] [id number]  Phone: [phone]  Fax: [phone]  Follow Up Time:

## 2024-02-13 NOTE — PROVIDER CONTACT NOTE (CHANGE IN STATUS NOTIFICATION) - ACTION/TREATMENT ORDERED:
LR fluid bolus administered, EKG completed, vitals assessed-see flowsheet. LR fluid bolus administered, EKG completed, vitals assessed-see flowsheet with improvement of mental status and vitals.

## 2024-02-13 NOTE — ASU DISCHARGE PLAN (ADULT/PEDIATRIC) - NURSING INSTRUCTIONS
Drink plenty of fluids. Change mustache dressing as needed. Take medications as prescribed. Please follow up with MD for post-op appointment.

## 2024-02-13 NOTE — ASU DISCHARGE PLAN (ADULT/PEDIATRIC) - NS MD DC FALL RISK RISK
For information on Fall & Injury Prevention, visit: [email or web address] OR  [email or web address] OR  https://www.cdc.gov/steadi/patient.html

## 2024-02-13 NOTE — PROVIDER CONTACT NOTE (CHANGE IN STATUS NOTIFICATION) - BACKGROUND
This is a a 48 year old male received from PACU s/p septoplasty. Pt ambulated from stretcher to chair, drank sip of apple juice and complained of nausea and feeling faint.

## 2024-02-13 NOTE — PROVIDER CONTACT NOTE (CHANGE IN STATUS NOTIFICATION) - SITUATION
Patient c/o nausea and feeling faint after transferring from stretcher to chair in ASU. Pt became pale and unresponsive for 30 seconds, rapid response called.

## 2024-02-13 NOTE — BRIEF OPERATIVE NOTE - NSICDXBRIEFPROCEDURE_GEN_ALL_CORE_FT
PROCEDURES:  Nasal septoplasty 13-Feb-2024 09:25:46  Edith Begum  Repair, nasal valve 13-Feb-2024 09:25:52  Edith Begum

## 2024-02-21 ENCOUNTER — APPOINTMENT (OUTPATIENT)
Dept: OTOLARYNGOLOGY | Facility: CLINIC | Age: 49
End: 2024-02-21
Payer: COMMERCIAL

## 2024-02-21 VITALS
SYSTOLIC BLOOD PRESSURE: 150 MMHG | WEIGHT: 144 LBS | HEART RATE: 76 BPM | BODY MASS INDEX: 21.57 KG/M2 | HEIGHT: 68.5 IN | OXYGEN SATURATION: 100 % | DIASTOLIC BLOOD PRESSURE: 98 MMHG

## 2024-02-21 PROBLEM — Q87.89 OTHER SPECIFIED CONGENITAL MALFORMATION SYNDROMES, NOT ELSEWHERE CLASSIFIED: Chronic | Status: ACTIVE | Noted: 2024-01-30

## 2024-02-21 PROCEDURE — 99024 POSTOP FOLLOW-UP VISIT: CPT

## 2024-03-01 ENCOUNTER — APPOINTMENT (OUTPATIENT)
Dept: OTOLARYNGOLOGY | Facility: CLINIC | Age: 49
End: 2024-03-01
Payer: COMMERCIAL

## 2024-03-01 PROCEDURE — 99024 POSTOP FOLLOW-UP VISIT: CPT

## 2024-03-05 NOTE — REASON FOR VISIT
[de-identified] : Septoplasty, bilateral inferior turbinate submucous resection and outfracture, and bilateral nasal valve repair [de-identified] : 2/14/24 [de-identified] : Reports left nasal obstruction that started two days ago. Reports drainage from that side. Completed postop abx.

## 2024-03-05 NOTE — ASSESSMENT
[FreeTextEntry1] : 48 year old male with nasal valve collapse, septal deviation sp septo and NVR. Left area of graft with edema - injected with kenalog 40 today. Return in 2 mo.

## 2024-04-11 ENCOUNTER — APPOINTMENT (OUTPATIENT)
Dept: INTERNAL MEDICINE | Facility: CLINIC | Age: 49
End: 2024-04-11
Payer: COMMERCIAL

## 2024-04-11 VITALS
OXYGEN SATURATION: 97 % | RESPIRATION RATE: 16 BRPM | BODY MASS INDEX: 21.57 KG/M2 | HEIGHT: 68.5 IN | HEART RATE: 59 BPM | WEIGHT: 144 LBS | SYSTOLIC BLOOD PRESSURE: 120 MMHG | TEMPERATURE: 97.9 F | DIASTOLIC BLOOD PRESSURE: 70 MMHG

## 2024-04-11 DIAGNOSIS — H10.30 UNSPECIFIED ACUTE CONJUNCTIVITIS, UNSPECIFIED EYE: ICD-10-CM

## 2024-04-11 PROCEDURE — 99213 OFFICE O/P EST LOW 20 MIN: CPT

## 2024-04-11 NOTE — PHYSICAL EXAM
[No Acute Distress] : no acute distress [EOMI] : extraocular movements intact [Normal Outer Ear/Nose] : the outer ears and nose were normal in appearance [Normal TMs] : both tympanic membranes were normal [No JVD] : no jugular venous distention [No Lymphadenopathy] : no lymphadenopathy [Supple] : supple [No Respiratory Distress] : no respiratory distress  [No Accessory Muscle Use] : no accessory muscle use [Clear to Auscultation] : lungs were clear to auscultation bilaterally [Normal Rate] : normal rate  [Regular Rhythm] : with a regular rhythm [Normal S1, S2] : normal S1 and S2 [No Edema] : there was no peripheral edema [Soft] : abdomen soft [Normal Anterior Cervical Nodes] : no anterior cervical lymphadenopathy [No Rash] : no rash [Normal Affect] : the affect was normal [Alert and Oriented x3] : oriented to person, place, and time [Normal Insight/Judgement] : insight and judgment were intact [de-identified] : left conjunctivitis.

## 2024-04-11 NOTE — PLAN
[FreeTextEntry1] : 1. Start Moxifloxacin 1 drop left eye TID x 7 days. 2. Good hand hygiene. 3. Letter given for work.

## 2024-04-11 NOTE — REVIEW OF SYSTEMS
[Discharge] : discharge [Redness] : redness [Vision Problems] : vision problems [Itching] : itching [Negative] : Heme/Lymph [Pain] : no pain [Dryness] : no dryness [Itching] : no itching [Skin Rash] : no skin rash [Headache] : no headache [Dizziness] : no dizziness

## 2024-04-11 NOTE — HISTORY OF PRESENT ILLNESS
[FreeTextEntry8] : 48M w/ PMHx of Bdwh-Itaz-Ygeg syndrome, MS, RA, who presents to office with c/o left eye redness with purulent drainage x 2 days. 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

## 2024-05-24 ENCOUNTER — NON-APPOINTMENT (OUTPATIENT)
Age: 49
End: 2024-05-24

## 2024-05-24 ENCOUNTER — APPOINTMENT (OUTPATIENT)
Dept: OPHTHALMOLOGY | Facility: CLINIC | Age: 49
End: 2024-05-24
Payer: COMMERCIAL

## 2024-05-24 PROCEDURE — 92014 COMPRE OPH EXAM EST PT 1/>: CPT

## 2024-06-20 ENCOUNTER — OUTPATIENT (OUTPATIENT)
Dept: OUTPATIENT SERVICES | Facility: HOSPITAL | Age: 49
LOS: 1 days | End: 2024-06-20
Payer: COMMERCIAL

## 2024-06-20 ENCOUNTER — RESULT REVIEW (OUTPATIENT)
Age: 49
End: 2024-06-20

## 2024-06-20 ENCOUNTER — APPOINTMENT (OUTPATIENT)
Dept: RADIOLOGY | Facility: CLINIC | Age: 49
End: 2024-06-20
Payer: COMMERCIAL

## 2024-06-20 ENCOUNTER — APPOINTMENT (OUTPATIENT)
Dept: RADIOLOGY | Facility: CLINIC | Age: 49
End: 2024-06-20

## 2024-06-20 DIAGNOSIS — Z98.890 OTHER SPECIFIED POSTPROCEDURAL STATES: Chronic | ICD-10-CM

## 2024-06-20 DIAGNOSIS — R05.9 COUGH, UNSPECIFIED: ICD-10-CM

## 2024-06-20 PROCEDURE — 71046 X-RAY EXAM CHEST 2 VIEWS: CPT | Mod: 26

## 2024-06-20 PROCEDURE — 71046 X-RAY EXAM CHEST 2 VIEWS: CPT

## 2024-06-20 RX ORDER — PREDNISONE 20 MG/1
20 TABLET ORAL
Qty: 21 | Refills: 0 | Status: ACTIVE | COMMUNITY
Start: 2024-06-20 | End: 1900-01-01

## 2024-06-20 RX ORDER — AZITHROMYCIN 500 MG/1
500 TABLET, FILM COATED ORAL DAILY
Qty: 7 | Refills: 0 | Status: ACTIVE | COMMUNITY
Start: 2024-06-20 | End: 1900-01-01

## 2024-06-20 RX ORDER — PROMETHAZINE HYDROCHLORIDE AND DEXTROMETHORPHAN HYDROBROMIDE ORAL SOLUTION 15; 6.25 MG/5ML; MG/5ML
6.25-15 SOLUTION ORAL
Qty: 300 | Refills: 0 | Status: ACTIVE | COMMUNITY
Start: 2024-06-20 | End: 1900-01-01

## 2024-06-21 ENCOUNTER — NON-APPOINTMENT (OUTPATIENT)
Age: 49
End: 2024-06-21

## 2024-06-26 ENCOUNTER — NON-APPOINTMENT (OUTPATIENT)
Age: 49
End: 2024-06-26

## 2024-06-26 ENCOUNTER — APPOINTMENT (OUTPATIENT)
Dept: INTERNAL MEDICINE | Facility: CLINIC | Age: 49
End: 2024-06-26
Payer: COMMERCIAL

## 2024-06-26 VITALS
OXYGEN SATURATION: 96 % | BODY MASS INDEX: 21.27 KG/M2 | RESPIRATION RATE: 16 BRPM | WEIGHT: 142 LBS | TEMPERATURE: 98 F | SYSTOLIC BLOOD PRESSURE: 120 MMHG | HEART RATE: 83 BPM | DIASTOLIC BLOOD PRESSURE: 70 MMHG | HEIGHT: 68.5 IN

## 2024-06-26 DIAGNOSIS — J04.10 ACUTE TRACHEITIS W/OUT OBSTRUCTION: ICD-10-CM

## 2024-06-26 DIAGNOSIS — Q87.89 OTHER SPECIFIED CONGENITAL MALFORMATION SYNDROMES, NOT ELSEWHERE CLASSIFIED: ICD-10-CM

## 2024-06-26 PROCEDURE — 96372 THER/PROPH/DIAG INJ SC/IM: CPT

## 2024-06-26 PROCEDURE — 99213 OFFICE O/P EST LOW 20 MIN: CPT | Mod: 25

## 2024-06-26 RX ORDER — MOXIFLOXACIN OPHTHALMIC 5 MG/ML
0.5 SOLUTION/ DROPS OPHTHALMIC 3 TIMES DAILY
Qty: 1 | Refills: 0 | Status: DISCONTINUED | COMMUNITY
Start: 2024-04-11 | End: 2024-06-26

## 2024-06-26 RX ORDER — RIMEGEPANT SULFATE 75 MG/75MG
75 TABLET, ORALLY DISINTEGRATING ORAL
Qty: 8 | Refills: 2 | Status: DISCONTINUED | COMMUNITY
Start: 2023-04-05 | End: 2024-06-26

## 2024-06-26 RX ORDER — TRIAMCINOLONE ACETONIDE 40 MG/ML
40 SUSPENSION INTRA-ARTERIAL; INTRAMUSCULAR
Qty: 1 | Refills: 0 | Status: COMPLETED | OUTPATIENT
Start: 2024-06-26

## 2024-06-26 RX ORDER — PANTOPRAZOLE 40 MG/1
40 TABLET, DELAYED RELEASE ORAL DAILY
Qty: 30 | Refills: 2 | Status: ACTIVE | COMMUNITY
Start: 2024-06-26 | End: 1900-01-01

## 2024-06-26 RX ORDER — PREDNISONE 20 MG/1
20 TABLET ORAL
Qty: 33 | Refills: 0 | Status: ACTIVE | COMMUNITY
Start: 2024-06-26 | End: 1900-01-01

## 2024-06-26 RX ADMIN — TRIAMCINOLONE ACETONIDE 0 MG/ML: 40 SUSPENSION INTRA-ARTERIAL; INTRAMUSCULAR at 00:00

## 2024-06-28 ENCOUNTER — APPOINTMENT (OUTPATIENT)
Dept: OTOLARYNGOLOGY | Facility: CLINIC | Age: 49
End: 2024-06-28
Payer: COMMERCIAL

## 2024-06-28 VITALS
DIASTOLIC BLOOD PRESSURE: 80 MMHG | HEART RATE: 80 BPM | WEIGHT: 142 LBS | BODY MASS INDEX: 21.27 KG/M2 | SYSTOLIC BLOOD PRESSURE: 137 MMHG | HEIGHT: 68.5 IN

## 2024-06-28 PROCEDURE — 99213 OFFICE O/P EST LOW 20 MIN: CPT

## 2024-07-10 NOTE — HISTORY OF PRESENT ILLNESS
[de-identified] : The patient comes in today for a followup evaluation and reassessment. There are several issues to discuss.\par \par With regards to his history of multiple sclerosis, the patient has received the first 2 doses ofOcrevus. They were given and 300 mg increments approximately one month apart. Initially, he developed a sore throat and mild lethargy, but then within several days he felt an amazing improvement overall in his symptomatology. His next dose is scheduled for approximately 6 months from now in which she will receive 600 mg. He will then receive 600 mg every 6 months going forward if tolerated.\par \par The patient noted the onset of swelling of his left eyelid. He awakens with slight crusting this morning. He denies any contacts with anyone that may have had conjunctivitis.\par \par With regard to his underlying pulmonary issues, he is relatively stable. He denies any chest pain or pleuritic pain. There has been no overt breathlessness. He has not been exercising. He denies any sputum production. There has been no change in bowel habits. He now comes in for this assessment. - PPSV 10%. The patient requires nursing assistance with all ADLs,  - Supportive care.  - Turn and position.  - Continue with good skin care.

## 2024-07-25 NOTE — ED ADULT NURSE NOTE - SUICIDE SCREENING DEPRESSION
I received a refill request for Famotidine 20 mg.  The pt was last seen on 07/15/24 and has an ob follow up visit scheduled for 07/29/2024.  The Rx was last sent 05/13/24 #60 with 1 additional refill.  Please advise.  Thank you.     Negative

## 2024-08-09 ENCOUNTER — APPOINTMENT (OUTPATIENT)
Dept: OTOLARYNGOLOGY | Facility: CLINIC | Age: 49
End: 2024-08-09

## 2024-08-09 PROCEDURE — 99213 OFFICE O/P EST LOW 20 MIN: CPT | Mod: 25

## 2024-08-09 NOTE — HISTORY OF PRESENT ILLNESS
[de-identified] :  48 year old male with history of nasal obstruction. s/p Septoplasty, bilateral inferior turbinate submucous resection and outfracture, and bilateral nasal valve repair 2/14/24 States breathing improved with procedure.  Report still having difficulty breathing through the left nostril, occasionally clogged.  Has not been compliant of Budesonide rinse Received 1 steroid injection at post op visit Cough has subsided  Denies nasal pain, epistaxis and fevers  He is traveling this weekend.

## 2024-09-11 ENCOUNTER — APPOINTMENT (OUTPATIENT)
Dept: INTERNAL MEDICINE | Facility: CLINIC | Age: 49
End: 2024-09-11
Payer: COMMERCIAL

## 2024-09-11 VITALS
BODY MASS INDEX: 21.67 KG/M2 | HEIGHT: 68 IN | WEIGHT: 143 LBS | RESPIRATION RATE: 16 BRPM | HEART RATE: 64 BPM | SYSTOLIC BLOOD PRESSURE: 110 MMHG | DIASTOLIC BLOOD PRESSURE: 70 MMHG | OXYGEN SATURATION: 96 % | TEMPERATURE: 98.1 F

## 2024-09-11 DIAGNOSIS — G35 MULTIPLE SCLEROSIS: ICD-10-CM

## 2024-09-11 DIAGNOSIS — D56.3 THALASSEMIA MINOR: ICD-10-CM

## 2024-09-11 DIAGNOSIS — J98.4 OTHER DISORDERS OF LUNG: ICD-10-CM

## 2024-09-11 DIAGNOSIS — Z00.00 ENCOUNTER FOR GENERAL ADULT MEDICAL EXAMINATION W/OUT ABNORMAL FINDINGS: ICD-10-CM

## 2024-09-11 DIAGNOSIS — Q87.89 OTHER SPECIFIED CONGENITAL MALFORMATION SYNDROMES, NOT ELSEWHERE CLASSIFIED: ICD-10-CM

## 2024-09-11 DIAGNOSIS — J32.9 CHRONIC SINUSITIS, UNSPECIFIED: ICD-10-CM

## 2024-09-11 DIAGNOSIS — R53.83 OTHER FATIGUE: ICD-10-CM

## 2024-09-11 PROCEDURE — 94729 DIFFUSING CAPACITY: CPT

## 2024-09-11 PROCEDURE — 94727 GAS DIL/WSHOT DETER LNG VOL: CPT

## 2024-09-11 PROCEDURE — 94060 EVALUATION OF WHEEZING: CPT

## 2024-09-11 PROCEDURE — 99214 OFFICE O/P EST MOD 30 MIN: CPT | Mod: 25

## 2024-09-11 PROCEDURE — ZZZZZ: CPT

## 2024-09-11 NOTE — PLAN
[FreeTextEntry1] : 1. Continue current medications as outlined above.  2. The patient will undergo routine fasting bloodwork in the near future, including a Lyme titer, Ehrlichia, and Babesia.    3. Follow up in 6 months with wellness and EKG.    4. Continue to follow with neurology for monitoring and treatment of MS.    5. Continue to follow with Dr. Bryant for treatment of fatigue which may be a consequence of long COVID.    6. Continue to follow with Dr. Begum for treatment of sinus issues.  He will consider instituting the nasal washes with budesonide as she had recommended.  7. Follow up with dermatology for total body skin evaluation.    8. Cardiovascular exercise as tolerated.  9.  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

## 2024-09-11 NOTE — ADDENDUM
[FreeTextEntry1] : This note was written by Shoshana Martel on 09/11/2024 acting as a medical scribe for Dr. Lance Guidry MD. All medical entries made by the scribe were at my, Dr. Lance Guidry's, direction and personally dictated by me on 09/11/2024. I have reviewed the chart and agree that the record accurately reflects my personal performance of the history, review of systems, assessment, and plan. I have also personally directed, reviewed, and agreed with the chart.

## 2024-09-11 NOTE — PHYSICAL EXAM
[General Appearance - Alert] : alert [General Appearance - In No Acute Distress] : in no acute distress [Sclera] : the sclera and conjunctiva were normal [PERRL With Normal Accommodation] : pupils were equal in size, round, and reactive to light [Extraocular Movements] : extraocular movements were intact [Outer Ear] : the ears and nose were normal in appearance [Oropharynx] : the oropharynx was normal [Neck Appearance] : the appearance of the neck was normal [Neck Cervical Mass (___cm)] : no neck mass was observed [Jugular Venous Distention Increased] : there was no jugular-venous distention [Thyroid Diffuse Enlargement] : the thyroid was not enlarged [Thyroid Nodule] : there were no palpable thyroid nodules [Auscultation Breath Sounds / Voice Sounds] : lungs were clear to auscultation bilaterally [Heart Rate And Rhythm] : heart rate was normal and rhythm regular [Heart Sounds] : normal S1 and S2 [Heart Sounds Gallop] : no gallops [Murmurs] : no murmurs [Heart Sounds Pericardial Friction Rub] : no pericardial rub [Arterial Pulses Carotid] : carotid pulses were normal with no bruits [Edema] : there was no peripheral edema [Bowel Sounds] : normal bowel sounds [Abdomen Soft] : soft [Abdomen Tenderness] : non-tender [] : no hepato-splenomegaly [Abdomen Mass (___ Cm)] : no abdominal mass palpated [Normal Posterior Cervical Nodes] : no posterior cervical lymphadenopathy [Normal Anterior Cervical Nodes] : no anterior cervical lymphadenopathy [Coordination Grossly Intact] : coordination grossly intact [Normal Gait] : normal gait [Normal Affect] : the affect was normal [Normal Insight/Judgement] : insight and judgment were intact [FreeTextEntry1] : 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 [de-identified] : 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

## 2024-09-11 NOTE — HISTORY OF PRESENT ILLNESS
[FreeTextEntry1] :  The patient comes in for a routine follow-up evaluation. [de-identified] : The patient is feeling well at this time. The patient has a history of chronic sinus issues and underwent extensive surgery to repair his nasal valves, the deviated septum, as well as the chronic sinus issues. He had a septoplasty, bilateral inferior turbinate submucous resection and outfracture, and bilateral nasal valve repair on 2/14/24 with Dr. Begum. He reports that he did not have any complications after the procedure, however, he continues to have difficulty breathing through the left nostril, which is occasionally clogged. He denies any purulent nasal secretions at this time.  He was told to use nasal washes with budesonide, but he has not been compliant in this regard.  The patient had covid in 2021 and has been complaining of fatigue since. He states that his symptoms continue, for which he has followed with rheumatologist, Dr. Bryant.  He does not nap. The fatigue affects his daily life as he is not able to optimally complete tasks and activities. He is usually very active and but has not been of late due to this ongoing issue.  He was placed on a low-dose of amitriptyline, but it is not helped very much.  He has a history of BHD and reports he is well from a pulmonary standpoint. There has been no cough, sputum production, hemoptysis, or wheeze.  The patient does have a history of Multiple Sclerosis for which he continues on Ocrevus injections every 6 months with neurology. He is not up to date on his dermatologic evaluation. There have been no cough, fevers, chills, or night sweats. There has been no chest pain, shortness of breath, palpitations, or PND. There have been no other acute constitutional symptoms. He comes in for this assessment.

## 2024-09-11 NOTE — DATA REVIEWED
[FreeTextEntry1] : A pulmonary function test is performed.  Lung volumes are mildly reduced in a symmetric fashion.  Lung mechanics reveal normal flow rates.  Slight bronchodilator reactivity is demonstrated.  The DLCO and saturation are maintained.  This represents a mild degree of restriction.  When compared to the prior study, the lung volumes remain relatively stable.  The flow rates have improved slightly.

## 2024-09-27 ENCOUNTER — RX RENEWAL (OUTPATIENT)
Age: 49
End: 2024-09-27

## 2024-10-28 ENCOUNTER — RX RENEWAL (OUTPATIENT)
Age: 49
End: 2024-10-28

## 2024-11-14 ENCOUNTER — EMERGENCY (EMERGENCY)
Facility: HOSPITAL | Age: 49
LOS: 0 days | Discharge: HOME | End: 2024-11-14
Attending: STUDENT IN AN ORGANIZED HEALTH CARE EDUCATION/TRAINING PROGRAM
Payer: COMMERCIAL

## 2024-11-14 VITALS
TEMPERATURE: 98 F | RESPIRATION RATE: 20 BRPM | OXYGEN SATURATION: 100 % | SYSTOLIC BLOOD PRESSURE: 111 MMHG | HEART RATE: 89 BPM | DIASTOLIC BLOOD PRESSURE: 60 MMHG

## 2024-11-14 VITALS
SYSTOLIC BLOOD PRESSURE: 118 MMHG | HEART RATE: 90 BPM | OXYGEN SATURATION: 97 % | TEMPERATURE: 98 F | DIASTOLIC BLOOD PRESSURE: 80 MMHG | RESPIRATION RATE: 18 BRPM

## 2024-11-14 DIAGNOSIS — Z98.890 OTHER SPECIFIED POSTPROCEDURAL STATES: Chronic | ICD-10-CM

## 2024-11-14 LAB
ALBUMIN SERPL ELPH-MCNC: 4.3 G/DL — SIGNIFICANT CHANGE UP (ref 3.3–5)
ALP SERPL-CCNC: 60 U/L — SIGNIFICANT CHANGE UP (ref 40–120)
ALT FLD-CCNC: 14 U/L — SIGNIFICANT CHANGE UP (ref 12–78)
ANION GAP SERPL CALC-SCNC: 7 MMOL/L — SIGNIFICANT CHANGE UP (ref 5–17)
ANISOCYTOSIS BLD QL: SLIGHT — SIGNIFICANT CHANGE UP
APTT BLD: 29.4 SEC — SIGNIFICANT CHANGE UP (ref 24.5–35.6)
AST SERPL-CCNC: 7 U/L — LOW (ref 15–37)
BASOPHILS # BLD AUTO: 0.03 K/UL — SIGNIFICANT CHANGE UP (ref 0–0.2)
BASOPHILS NFR BLD AUTO: 0.5 % — SIGNIFICANT CHANGE UP (ref 0–2)
BILIRUB SERPL-MCNC: 1.8 MG/DL — HIGH (ref 0.2–1.2)
BUN SERPL-MCNC: 11 MG/DL — SIGNIFICANT CHANGE UP (ref 7–23)
CALCIUM SERPL-MCNC: 9.5 MG/DL — SIGNIFICANT CHANGE UP (ref 8.5–10.1)
CHLORIDE SERPL-SCNC: 109 MMOL/L — HIGH (ref 96–108)
CO2 SERPL-SCNC: 25 MMOL/L — SIGNIFICANT CHANGE UP (ref 22–31)
CREAT SERPL-MCNC: 0.99 MG/DL — SIGNIFICANT CHANGE UP (ref 0.5–1.3)
DACRYOCYTES BLD QL SMEAR: SLIGHT — SIGNIFICANT CHANGE UP
EGFR: 93 ML/MIN/1.73M2 — SIGNIFICANT CHANGE UP
ELLIPTOCYTES BLD QL SMEAR: SLIGHT — SIGNIFICANT CHANGE UP
EOSINOPHIL # BLD AUTO: 0.21 K/UL — SIGNIFICANT CHANGE UP (ref 0–0.5)
EOSINOPHIL NFR BLD AUTO: 3.3 % — SIGNIFICANT CHANGE UP (ref 0–6)
FLUAV AG NPH QL: SIGNIFICANT CHANGE UP
FLUBV AG NPH QL: SIGNIFICANT CHANGE UP
GLUCOSE SERPL-MCNC: 100 MG/DL — HIGH (ref 70–99)
HCT VFR BLD CALC: 40.7 % — SIGNIFICANT CHANGE UP (ref 39–50)
HGB BLD-MCNC: 12.3 G/DL — LOW (ref 13–17)
HYPOCHROMIA BLD QL: SLIGHT — SIGNIFICANT CHANGE UP
IMM GRANULOCYTES NFR BLD AUTO: 0.3 % — SIGNIFICANT CHANGE UP (ref 0–0.9)
INR BLD: 1.1 RATIO — SIGNIFICANT CHANGE UP (ref 0.85–1.16)
LYMPHOCYTES # BLD AUTO: 1.31 K/UL — SIGNIFICANT CHANGE UP (ref 1–3.3)
LYMPHOCYTES # BLD AUTO: 20.3 % — SIGNIFICANT CHANGE UP (ref 13–44)
MAGNESIUM SERPL-MCNC: 1.9 MG/DL — SIGNIFICANT CHANGE UP (ref 1.6–2.6)
MANUAL SMEAR VERIFICATION: SIGNIFICANT CHANGE UP
MCHC RBC-ENTMCNC: 17.9 PG — LOW (ref 27–34)
MCHC RBC-ENTMCNC: 30.2 G/DL — LOW (ref 32–36)
MCV RBC AUTO: 59.1 FL — LOW (ref 80–100)
MICROCYTES BLD QL: SIGNIFICANT CHANGE UP
MONOCYTES # BLD AUTO: 0.78 K/UL — SIGNIFICANT CHANGE UP (ref 0–0.9)
MONOCYTES NFR BLD AUTO: 12.1 % — SIGNIFICANT CHANGE UP (ref 2–14)
NEUTROPHILS # BLD AUTO: 4.11 K/UL — SIGNIFICANT CHANGE UP (ref 1.8–7.4)
NEUTROPHILS NFR BLD AUTO: 63.5 % — SIGNIFICANT CHANGE UP (ref 43–77)
NT-PROBNP SERPL-SCNC: 31 PG/ML — SIGNIFICANT CHANGE UP (ref 0–125)
PLAT MORPH BLD: NORMAL — SIGNIFICANT CHANGE UP
PLATELET # BLD AUTO: 220 K/UL — SIGNIFICANT CHANGE UP (ref 150–400)
POIKILOCYTOSIS BLD QL AUTO: SIGNIFICANT CHANGE UP
POTASSIUM SERPL-MCNC: 3.5 MMOL/L — SIGNIFICANT CHANGE UP (ref 3.5–5.3)
POTASSIUM SERPL-SCNC: 3.5 MMOL/L — SIGNIFICANT CHANGE UP (ref 3.5–5.3)
PROT SERPL-MCNC: 6.7 GM/DL — SIGNIFICANT CHANGE UP (ref 6–8.3)
PROTHROM AB SERPL-ACNC: 13 SEC — SIGNIFICANT CHANGE UP (ref 9.9–13.4)
RBC # BLD: 6.89 M/UL — HIGH (ref 4.2–5.8)
RBC # FLD: 17.7 % — HIGH (ref 10.3–14.5)
RBC BLD AUTO: ABNORMAL
RSV RNA NPH QL NAA+NON-PROBE: SIGNIFICANT CHANGE UP
SARS-COV-2 RNA SPEC QL NAA+PROBE: SIGNIFICANT CHANGE UP
SODIUM SERPL-SCNC: 141 MMOL/L — SIGNIFICANT CHANGE UP (ref 135–145)
TROPONIN I, HIGH SENSITIVITY RESULT: 4.89 NG/L — SIGNIFICANT CHANGE UP
WBC # BLD: 6.46 K/UL — SIGNIFICANT CHANGE UP (ref 3.8–10.5)
WBC # FLD AUTO: 6.46 K/UL — SIGNIFICANT CHANGE UP (ref 3.8–10.5)

## 2024-11-14 PROCEDURE — 36415 COLL VENOUS BLD VENIPUNCTURE: CPT

## 2024-11-14 PROCEDURE — 83880 ASSAY OF NATRIURETIC PEPTIDE: CPT

## 2024-11-14 PROCEDURE — 80053 COMPREHEN METABOLIC PANEL: CPT

## 2024-11-14 PROCEDURE — 93010 ELECTROCARDIOGRAM REPORT: CPT

## 2024-11-14 PROCEDURE — 99285 EMERGENCY DEPT VISIT HI MDM: CPT

## 2024-11-14 PROCEDURE — 82962 GLUCOSE BLOOD TEST: CPT

## 2024-11-14 PROCEDURE — 93005 ELECTROCARDIOGRAM TRACING: CPT

## 2024-11-14 PROCEDURE — 0241U: CPT

## 2024-11-14 PROCEDURE — 85730 THROMBOPLASTIN TIME PARTIAL: CPT

## 2024-11-14 PROCEDURE — 71045 X-RAY EXAM CHEST 1 VIEW: CPT

## 2024-11-14 PROCEDURE — 71045 X-RAY EXAM CHEST 1 VIEW: CPT | Mod: 26

## 2024-11-14 PROCEDURE — 84484 ASSAY OF TROPONIN QUANT: CPT

## 2024-11-14 PROCEDURE — 85025 COMPLETE CBC W/AUTO DIFF WBC: CPT

## 2024-11-14 PROCEDURE — 36000 PLACE NEEDLE IN VEIN: CPT

## 2024-11-14 PROCEDURE — 99285 EMERGENCY DEPT VISIT HI MDM: CPT | Mod: 25

## 2024-11-14 PROCEDURE — 83735 ASSAY OF MAGNESIUM: CPT

## 2024-11-14 PROCEDURE — 85610 PROTHROMBIN TIME: CPT

## 2024-11-14 RX ORDER — SODIUM CHLORIDE 9 MG/ML
2000 INJECTION, SOLUTION INTRAMUSCULAR; INTRAVENOUS; SUBCUTANEOUS ONCE
Refills: 0 | Status: COMPLETED | OUTPATIENT
Start: 2024-11-14 | End: 2024-11-14

## 2024-11-14 RX ORDER — IBUPROFEN 200 MG
600 TABLET ORAL ONCE
Refills: 0 | Status: COMPLETED | OUTPATIENT
Start: 2024-11-14 | End: 2024-11-14

## 2024-11-14 RX ADMIN — SODIUM CHLORIDE 2000 MILLILITER(S): 9 INJECTION, SOLUTION INTRAMUSCULAR; INTRAVENOUS; SUBCUTANEOUS at 14:56

## 2024-11-14 RX ADMIN — Medication 600 MILLIGRAM(S): at 17:22

## 2024-11-14 NOTE — ED PROVIDER NOTE - PATIENT PORTAL LINK FT
You can access the FollowMyHealth Patient Portal offered by [facility] by registering at the following website: [email or web address]. By joining [employer]’s FollowMyHealth portal, you will also be able to view your health information using other applications (apps) compatible with our system.

## 2024-11-14 NOTE — ED ADULT NURSE NOTE - NS ED NURSE IV DC DT
8/13/2018      Lazara Perez  [address] [facility] [address]        Dear Lazara,    My records indicate that you have not yet had your lab work completed.  You may visit any [facility] to have your lab tests completed at your convenience.      Non-Fasting Labs - There is no need to fast prior to your labs.    Sincerely,      Angelito Hammond DO  [address] [facility] [address] [phone]   14-Nov-2024 17:15

## 2024-11-14 NOTE — ED STATDOCS - HIV OFFER
Previously Declined (within the last year) Normal rate, regular rhythm.  Heart sounds S1, S2.  No murmurs, rubs or gallops.

## 2024-11-14 NOTE — ED ADULT NURSE NOTE - NSFALLRISKINTERV_ED_ALL_ED

## 2024-11-14 NOTE — ED STATDOCS - NSFOLLOWUPINSTRUCTIONS_ED_ALL_ED_FT
Please follow-up with your doctor(s) within the next 3 days, but see medical sooner if your symptoms persist or worsen.  Please call tomorrow for an appointment.    You were given a copy of your labs and/or imaging.  Please go-over these with your doctor(s).     If you have any worsening of symptoms or any other concerns please see your doctor or return to the ED immediately.    Please continue taking your home medications as directed    ANY PENDING RESULTS: You can access the FollowiCIMSHealth Patient Portal offered by [employer] by registering at the following website: [email or web address]. By joining [employer]’s FollowMyHealth portal, you will also be able to view your health information using other applications (apps) compatible with our system.

## 2024-11-14 NOTE — ED PROVIDER NOTE - OBJECTIVE STATEMENT
49yoM PMH Swfi-Rtyo-Ucfw syndrome, Multiple sclerosis Presents today with a syncopal episode.  Patient reports that his mother is sick, has not eaten or drank anything today, states that she was getting her blood drawn when he felt lightheaded nauseous and almost passed out, sat down in a chair, no fall or head strike.  Endorses a frontal headache at this time as well as feeling shaky.  Denies vision changes, numbness tingling weakness, chest pain, shortness of breath, nausea vomiting abdominal pain, urinary symptoms, diarrhea, black bloody stools, rashes.

## 2024-11-14 NOTE — ED ADULT NURSE NOTE - OBJECTIVE STATEMENT
pt presents to ed s/p near syncope in hallway of [facility]. pt has been endorsing moctezuma all day. pt states he became dizzy, nauseas, weak, and "woozy". pt sat himself down on floor in hallway. no loc, no fall. pmhx MS and lung disease. ekg done and pt placed on cardiac monitoring. no further complaints.

## 2024-11-14 NOTE — ED PROVIDER NOTE - CLINICAL SUMMARY MEDICAL DECISION MAKING FREE TEXT BOX
presentation most consistent with near syncopal episode likely secondary to hypoglycemia versus dehydration, plan for EKG rule out ACS, labs, IV hydration, chest x-ray, monitor and reassessment presentation most consistent with near syncopal episode likely secondary to hypoglycemia versus dehydration, plan for EKG rule out ACS, labs, IV hydration, chest x-ray, monitor and reassessment. EKG shows normal sinus rhythm with incomplete right bundle branch block, no acute ischemic changes.  Labs including troponin grossly unremarkable.  Chest x-ray shows no acute findings.  After IV hydration patient reports he feels significantly better, tolerating p.o. well, ambulating bath or without symptoms.  Patient advised follow-up with primary care doctor, given strict follow-up instructions return precautions and DC home in stable condition

## 2024-11-14 NOTE — ED ADULT TRIAGE NOTE - CHIEF COMPLAINT QUOTE
Pt c/o dizziness after watching his mother get blood work done. -LOC. HX of MS. Denies chest pain, HA, and vision changes. STAT EKG to be completed. BGM 82.

## 2024-11-14 NOTE — ED PROVIDER NOTE - PROGRESS NOTE DETAILS
Progress: Sidle: pt is feeling much better. labs and imaging not actionable. pt ambulated and tolerated PO sandwich and apple juice. Discussed with patient need to return to ED if symptoms don't continue to improve or recur or develops any new or worsening symptoms that are of concern

## 2024-11-14 NOTE — ED PROVIDER NOTE - NSFOLLOWUPINSTRUCTIONS_ED_ALL_ED_FT
Return to the Emergency Department for worsening or persistent symptoms, and/or ANY NEW OR CONCERNING SYMPTOMS. If you have issues obtaining follow up, please call: [phone] or [phone]  to obtain a doctor or specialist who takes your insurance in your area.

## 2024-11-14 NOTE — ED STATDOCS - PATIENT PORTAL LINK FT
You can access the FollowMyHealth Patient Portal offered by [facility] by registering at the following website: [email or web address]. By joining [employer]’s FollowMyHealth portal, you will also be able to view your health information using other applications (apps) compatible with our system.

## 2024-11-14 NOTE — ED PROVIDER NOTE - PHYSICAL EXAMINATION
Constitutional: well appearing, NAD AAOx3  Eyes: EOMI, PERRL  Head: Normocephalic atraumatic  Mouth: no airway obstruction, posterior oropharynx clear without erythema or exudate  Neck: supple  Cardiac: regular rate and rhythm, no MRG  Resp: Lungs CTAB  GI: Abd s/nt/nd  Neuro: CN2-12 intact, strength 5/5x4, sensation grossly intact  Skin: No rashes    MSK: No midline tenderness of CTL spine, no tenderness or deformity of extremities x 4

## 2024-11-14 NOTE — ED STATDOCS - PROGRESS NOTE DETAILS
Kendall: pt is feeling much better. labs and imaging not actionable. pt ambulated and tolerated PO sandwich and apple juice. Discussed with patient need to return to ED if symptoms don't continue to improve or recur or develops any new or worsening symptoms that are of concern.

## 2025-01-13 ENCOUNTER — APPOINTMENT (OUTPATIENT)
Dept: INTERNAL MEDICINE | Facility: CLINIC | Age: 50
End: 2025-01-13
Payer: COMMERCIAL

## 2025-01-13 ENCOUNTER — NON-APPOINTMENT (OUTPATIENT)
Age: 50
End: 2025-01-13

## 2025-01-13 VITALS
OXYGEN SATURATION: 97 % | HEART RATE: 87 BPM | RESPIRATION RATE: 16 BRPM | DIASTOLIC BLOOD PRESSURE: 80 MMHG | SYSTOLIC BLOOD PRESSURE: 114 MMHG | HEIGHT: 68 IN | TEMPERATURE: 99.3 F | WEIGHT: 144 LBS | BODY MASS INDEX: 21.82 KG/M2

## 2025-01-13 DIAGNOSIS — R50.9 RESPIRATORY DISORDER, UNSPECIFIED: ICD-10-CM

## 2025-01-13 DIAGNOSIS — J98.9 RESPIRATORY DISORDER, UNSPECIFIED: ICD-10-CM

## 2025-01-13 DIAGNOSIS — J20.9 ACUTE BRONCHITIS, UNSPECIFIED: ICD-10-CM

## 2025-01-13 LAB
FLUAV SPEC QL CULT: NORMAL
FLUBV AG SPEC QL IA: NORMAL
SARS-COV-2 AG RESP QL IA.RAPID: NEGATIVE

## 2025-01-13 PROCEDURE — 87804 INFLUENZA ASSAY W/OPTIC: CPT | Mod: QW

## 2025-01-13 PROCEDURE — 99213 OFFICE O/P EST LOW 20 MIN: CPT

## 2025-01-13 PROCEDURE — 87811 SARS-COV-2 COVID19 W/OPTIC: CPT | Mod: QW

## 2025-01-13 RX ORDER — BENZONATATE 200 MG/1
200 CAPSULE ORAL 3 TIMES DAILY
Qty: 30 | Refills: 0 | Status: ACTIVE | COMMUNITY
Start: 2025-01-13 | End: 1900-01-01

## 2025-01-13 RX ORDER — PREDNISONE 20 MG/1
20 TABLET ORAL TWICE DAILY
Qty: 10 | Refills: 0 | Status: ACTIVE | COMMUNITY
Start: 2025-01-13 | End: 1900-01-01

## 2025-01-13 RX ORDER — AMOXICILLIN AND CLAVULANATE POTASSIUM 875; 125 MG/1; MG/1
875-125 TABLET, COATED ORAL
Qty: 14 | Refills: 0 | Status: ACTIVE | COMMUNITY
Start: 2025-01-13 | End: 1900-01-01

## 2025-01-15 ENCOUNTER — NON-APPOINTMENT (OUTPATIENT)
Age: 50
End: 2025-01-15

## 2025-01-15 LAB
FLUAV H1 2009 PAND RNA SPEC QL NAA+PROBE: DETECTED
RAPID RVP RESULT: DETECTED
SARS-COV-2 RNA RESP QL NAA+PROBE: NOT DETECTED

## 2025-02-12 ENCOUNTER — APPOINTMENT (OUTPATIENT)
Dept: OTOLARYNGOLOGY | Facility: CLINIC | Age: 50
End: 2025-02-12
Payer: COMMERCIAL

## 2025-02-12 ENCOUNTER — NON-APPOINTMENT (OUTPATIENT)
Age: 50
End: 2025-02-12

## 2025-02-12 VITALS
BODY MASS INDEX: 20.59 KG/M2 | DIASTOLIC BLOOD PRESSURE: 71 MMHG | HEART RATE: 81 BPM | RESPIRATION RATE: 17 BRPM | SYSTOLIC BLOOD PRESSURE: 112 MMHG | OXYGEN SATURATION: 97 % | WEIGHT: 139 LBS | HEIGHT: 69 IN

## 2025-02-12 PROCEDURE — 31231 NASAL ENDOSCOPY DX: CPT

## 2025-02-12 PROCEDURE — 99213 OFFICE O/P EST LOW 20 MIN: CPT | Mod: 25

## 2025-02-14 ENCOUNTER — OUTPATIENT (OUTPATIENT)
Dept: OUTPATIENT SERVICES | Facility: HOSPITAL | Age: 50
LOS: 1 days | End: 2025-02-14
Payer: COMMERCIAL

## 2025-02-14 ENCOUNTER — APPOINTMENT (OUTPATIENT)
Dept: CT IMAGING | Facility: CLINIC | Age: 50
End: 2025-02-14
Payer: COMMERCIAL

## 2025-02-14 DIAGNOSIS — J32.9 CHRONIC SINUSITIS, UNSPECIFIED: ICD-10-CM

## 2025-02-14 DIAGNOSIS — Z98.890 OTHER SPECIFIED POSTPROCEDURAL STATES: Chronic | ICD-10-CM

## 2025-02-14 PROCEDURE — 70486 CT MAXILLOFACIAL W/O DYE: CPT

## 2025-02-14 PROCEDURE — 70486 CT MAXILLOFACIAL W/O DYE: CPT | Mod: 26

## 2025-03-18 ENCOUNTER — APPOINTMENT (OUTPATIENT)
Dept: INTERNAL MEDICINE | Facility: CLINIC | Age: 50
End: 2025-03-18
Payer: COMMERCIAL

## 2025-03-18 ENCOUNTER — NON-APPOINTMENT (OUTPATIENT)
Age: 50
End: 2025-03-18

## 2025-03-18 VITALS
BODY MASS INDEX: 21.33 KG/M2 | DIASTOLIC BLOOD PRESSURE: 80 MMHG | HEART RATE: 79 BPM | TEMPERATURE: 97.9 F | HEIGHT: 69 IN | OXYGEN SATURATION: 96 % | RESPIRATION RATE: 16 BRPM | WEIGHT: 144 LBS | SYSTOLIC BLOOD PRESSURE: 122 MMHG

## 2025-03-18 DIAGNOSIS — Q87.89 OTHER SPECIFIED CONGENITAL MALFORMATION SYNDROMES, NOT ELSEWHERE CLASSIFIED: ICD-10-CM

## 2025-03-18 DIAGNOSIS — Z00.00 ENCOUNTER FOR GENERAL ADULT MEDICAL EXAMINATION W/OUT ABNORMAL FINDINGS: ICD-10-CM

## 2025-03-18 DIAGNOSIS — D56.3 THALASSEMIA MINOR: ICD-10-CM

## 2025-03-18 PROCEDURE — 99396 PREV VISIT EST AGE 40-64: CPT

## 2025-03-18 PROCEDURE — 93000 ELECTROCARDIOGRAM COMPLETE: CPT

## 2025-04-21 NOTE — ED ADULT TRIAGE NOTE - PAIN RATING/NUMBER SCALE (0-10): ACTIVITY
Thank you for coming to Urgent Care today. It was a pleasure caring for you!    What to Expect Next following your Urgent Care visit on 04/21/25 :  If you had any tests done today, the following is a general timeframe for results:   Strep swab - up to 2 hours  COVID/FLU swab - up to 2 hours  COVID/FLU/RSV swab - up to 2 hours  Swab of a lesion - up to 3 days  Urine Culture - up to 3 days  Gonorrhea/Chlamydia/Trichomonas  testing - up to 3 days  Blood work - up to 4 hours -3 days  Imaging (x-ray, CT, ultrasound, doppler) - up to 8 hours following completion of the test    You will be notified of these results as these results become available via phone number provided during check in process or [employer] shawn.    If you had any new referrals placed today, please call [phone] to get these appointments scheduled.      Patient specific education:  -Avoid NSAIDS when taking oral steroids    -Antibiotic Doxycyline ordered at this visit.   Monitor for signs and symptoms allergic reaction: facial/lip/tongue/throat swelling, difficulty breathing - seek immediate ER evaluation if occur. Itchy skin/hives, stop the medication and seek evaluation.  -Antibiotics may cause diarrhea side effect.  Consider eating yogurt with live/active cultures or taking an over the counter probiotic capsule daily to replenish healthy bacteria in your digestive tract. Start taking the probiotic supplements AFTER antibiotic course. In rare cases, antibiotics may cause a serious diarrhea infection called C Diff. If you experience watery diarrhea more than 5-7 days, seek repeat exam.  -Some antibiotics may cause increase sensitivity to the sun's UV rays. Use sunscreen with broad spectrum UV protection before sun exposure while on antibiotics.  -Please seek Urgent Care or ER evaluation if symptoms persist/worsen.      When to seek care in ER:  If you develop Red Flag Symptoms or your symptoms worsen in severity despite adherence to the medical plan  discussed today I recommend seeking care in the Emergency Department.   Red flag symptoms: acute worsening of symptoms, chest pain, shortness of breath, fever, one legged swelling or pain.       If you receive a survey following today's visit, please tell us how we did!     0

## 2025-07-15 ENCOUNTER — RX RENEWAL (OUTPATIENT)
Age: 50
End: 2025-07-15

## 2025-07-17 ENCOUNTER — APPOINTMENT (OUTPATIENT)
Dept: INTERNAL MEDICINE | Facility: CLINIC | Age: 50
End: 2025-07-17
Payer: COMMERCIAL

## 2025-07-17 VITALS
WEIGHT: 139 LBS | DIASTOLIC BLOOD PRESSURE: 70 MMHG | TEMPERATURE: 98 F | SYSTOLIC BLOOD PRESSURE: 110 MMHG | OXYGEN SATURATION: 98 % | HEART RATE: 63 BPM | RESPIRATION RATE: 16 BRPM | HEIGHT: 69 IN | BODY MASS INDEX: 20.59 KG/M2

## 2025-07-17 PROCEDURE — 99212 OFFICE O/P EST SF 10 MIN: CPT

## 2025-07-17 PROCEDURE — 99202 OFFICE O/P NEW SF 15 MIN: CPT

## (undated) DEVICE — Device

## (undated) DEVICE — STAPLER SKIN VISI-STAT 35 WIDE

## (undated) DEVICE — ELCTR ENT BOVIE SUCTION 10FR 6"

## (undated) DEVICE — ELCTR STRYKER NEPTUNE SMOKE EVACUATION PENCIL (GREEN)

## (undated) DEVICE — SUT PLAIN GUT 4-0 18" SC-1

## (undated) DEVICE — PACK SMR

## (undated) DEVICE — WARMING BLANKET LOWER ADULT

## (undated) DEVICE — SOL IRR POUR NS 0.9% 500ML

## (undated) DEVICE — LABELS BLANK W PEN

## (undated) DEVICE — ZIMMER BLADE DERMATONE

## (undated) DEVICE — SUT ETHILON 3-0 18" FS-1

## (undated) DEVICE — SOL ANTI FOG

## (undated) DEVICE — SUT CHROMIC 5-0 18" P-3

## (undated) DEVICE — DRSG STERISTRIPS 0.25 X 4"

## (undated) DEVICE — SYR ASEPTO

## (undated) DEVICE — BLADE SCALPEL SAFETYLOCK #15

## (undated) DEVICE — SUT CHROMIC 4-0 18" P-3

## (undated) DEVICE — ELCTR BIPOLAR CORD J&J 12FT DISP

## (undated) DEVICE — SUT MONOSOF 6-0 18" P-13

## (undated) DEVICE — SUT PDS II 5-0 18" P-3 UNDYED

## (undated) DEVICE — VISITEC 4X4

## (undated) DEVICE — GLV 5.5 PROTEXIS (WHITE)

## (undated) DEVICE — MARKING PEN W RULER

## (undated) DEVICE — DRAPE 3/4 SHEET 52X76"

## (undated) DEVICE — S&N ARTHROCARE ENT WAND TURBINATOR

## (undated) DEVICE — DRSG MASTISOL

## (undated) DEVICE — VENODYNE/SCD SLEEVE CALF MEDIUM

## (undated) DEVICE — PREP BETADINE KIT

## (undated) DEVICE — DRSG TEGADERM 2.5X3"

## (undated) DEVICE — DRSG NASOPORE 8CM FIRM